# Patient Record
Sex: MALE | Race: WHITE | NOT HISPANIC OR LATINO | Employment: OTHER | URBAN - NONMETROPOLITAN AREA
[De-identification: names, ages, dates, MRNs, and addresses within clinical notes are randomized per-mention and may not be internally consistent; named-entity substitution may affect disease eponyms.]

---

## 2017-05-26 ENCOUNTER — OFFICE VISIT (OUTPATIENT)
Dept: INTERNAL MEDICINE | Facility: CLINIC | Age: 47
End: 2017-05-26

## 2017-05-26 VITALS
HEART RATE: 90 BPM | WEIGHT: 265.4 LBS | OXYGEN SATURATION: 96 % | DIASTOLIC BLOOD PRESSURE: 80 MMHG | TEMPERATURE: 97.9 F | HEIGHT: 73 IN | BODY MASS INDEX: 35.17 KG/M2 | SYSTOLIC BLOOD PRESSURE: 114 MMHG

## 2017-05-26 DIAGNOSIS — Z76.89 ENCOUNTER TO ESTABLISH CARE: Primary | ICD-10-CM

## 2017-05-26 DIAGNOSIS — Z82.49 FAMILY HISTORY OF HEART ATTACK: ICD-10-CM

## 2017-05-26 DIAGNOSIS — E66.9 OBESITY, UNSPECIFIED OBESITY SEVERITY, UNSPECIFIED OBESITY TYPE: ICD-10-CM

## 2017-05-26 DIAGNOSIS — R53.83 FATIGUE, UNSPECIFIED TYPE: ICD-10-CM

## 2017-05-26 PROCEDURE — 99203 OFFICE O/P NEW LOW 30 MIN: CPT | Performed by: NURSE PRACTITIONER

## 2017-05-26 RX ORDER — HYDROCODONE BITARTRATE AND ACETAMINOPHEN 7.5; 325 MG/1; MG/1
1 TABLET ORAL EVERY 6 HOURS PRN
COMMUNITY
End: 2018-06-21

## 2017-05-26 RX ORDER — GABAPENTIN 600 MG/1
600 TABLET ORAL 4 TIMES DAILY
COMMUNITY
End: 2018-06-21 | Stop reason: DRUGHIGH

## 2017-05-30 ENCOUNTER — TELEPHONE (OUTPATIENT)
Dept: INTERNAL MEDICINE | Facility: CLINIC | Age: 47
End: 2017-05-30

## 2017-05-30 DIAGNOSIS — R73.01 ELEVATED FASTING BLOOD SUGAR: Primary | ICD-10-CM

## 2017-05-30 LAB
ALBUMIN SERPL-MCNC: 4.4 G/DL (ref 3.5–5)
ALBUMIN/GLOB SERPL: 1.6 G/DL (ref 1–2)
ALP SERPL-CCNC: 94 U/L (ref 38–126)
ALT SERPL-CCNC: 52 U/L (ref 13–69)
AST SERPL-CCNC: 25 U/L (ref 15–46)
BASOPHILS # BLD AUTO: 0.08 10*3/MM3 (ref 0–0.2)
BASOPHILS NFR BLD AUTO: 0.8 % (ref 0–2.5)
BILIRUB SERPL-MCNC: 0.6 MG/DL (ref 0.2–1.3)
BUN SERPL-MCNC: 15 MG/DL (ref 7–20)
BUN/CREAT SERPL: 18.8 (ref 6.3–21.9)
CALCIUM SERPL-MCNC: 9.5 MG/DL (ref 8.4–10.2)
CHLORIDE SERPL-SCNC: 105 MMOL/L (ref 98–107)
CHOLEST SERPL-MCNC: 198 MG/DL (ref 0–199)
CO2 SERPL-SCNC: 23 MMOL/L (ref 26–30)
CONV COMMENT: ABNORMAL
CREAT SERPL-MCNC: 0.8 MG/DL (ref 0.6–1.3)
EOSINOPHIL # BLD AUTO: 0.19 10*3/MM3 (ref 0–0.7)
EOSINOPHIL NFR BLD AUTO: 1.8 % (ref 0–7)
ERYTHROCYTE [DISTWIDTH] IN BLOOD BY AUTOMATED COUNT: 13.3 % (ref 11.5–14.5)
GLOBULIN SER CALC-MCNC: 2.8 GM/DL
GLUCOSE SERPL-MCNC: 109 MG/DL (ref 74–98)
HCT VFR BLD AUTO: 46.4 % (ref 42–52)
HDLC SERPL-MCNC: 32 MG/DL (ref 40–60)
HGB BLD-MCNC: 15.9 G/DL (ref 14–18)
IMM GRANULOCYTES # BLD: 0.03 10*3/MM3 (ref 0–0.06)
IMM GRANULOCYTES NFR BLD: 0.3 % (ref 0–0.6)
LDLC SERPL CALC-MCNC: ABNORMAL MG/DL
LYMPHOCYTES # BLD AUTO: 2.44 10*3/MM3 (ref 0.6–3.4)
LYMPHOCYTES NFR BLD AUTO: 23.3 % (ref 10–50)
MCH RBC QN AUTO: 29.2 PG (ref 27–31)
MCHC RBC AUTO-ENTMCNC: 34.3 G/DL (ref 30–37)
MCV RBC AUTO: 85.3 FL (ref 80–94)
MONOCYTES # BLD AUTO: 0.57 10*3/MM3 (ref 0–0.9)
MONOCYTES NFR BLD AUTO: 5.4 % (ref 0–12)
NEUTROPHILS # BLD AUTO: 7.17 10*3/MM3 (ref 2–6.9)
NEUTROPHILS NFR BLD AUTO: 68.4 % (ref 37–80)
NRBC BLD AUTO-RTO: 0 /100 WBC (ref 0–0)
PLATELET # BLD AUTO: 244 10*3/MM3 (ref 130–400)
POTASSIUM SERPL-SCNC: 4.4 MMOL/L (ref 3.5–5.1)
PROT SERPL-MCNC: 7.2 G/DL (ref 6.3–8.2)
PSA SERPL-MCNC: 1.2 NG/ML (ref 0.06–4)
RBC # BLD AUTO: 5.44 10*6/MM3 (ref 4.7–6.1)
SODIUM SERPL-SCNC: 142 MMOL/L (ref 137–145)
TESTOST SERPL-MCNC: 254 NG/DL (ref 348–1197)
TRIGL SERPL-MCNC: 446 MG/DL
VLDLC SERPL CALC-MCNC: ABNORMAL MG/DL
WBC # BLD AUTO: 10.48 10*3/MM3 (ref 4.8–10.8)

## 2017-06-14 ENCOUNTER — OFFICE VISIT (OUTPATIENT)
Dept: INTERNAL MEDICINE | Facility: CLINIC | Age: 47
End: 2017-06-14

## 2017-06-14 VITALS
DIASTOLIC BLOOD PRESSURE: 80 MMHG | OXYGEN SATURATION: 94 % | SYSTOLIC BLOOD PRESSURE: 120 MMHG | HEIGHT: 73 IN | BODY MASS INDEX: 36.05 KG/M2 | WEIGHT: 272 LBS | HEART RATE: 74 BPM

## 2017-06-14 DIAGNOSIS — E66.09 OBESITY DUE TO EXCESS CALORIES, UNSPECIFIED OBESITY SEVERITY: ICD-10-CM

## 2017-06-14 DIAGNOSIS — R73.09 ELEVATED GLUCOSE LEVEL: Primary | ICD-10-CM

## 2017-06-14 LAB — HBA1C MFR BLD: 5.9 %

## 2017-06-14 PROCEDURE — 83036 HEMOGLOBIN GLYCOSYLATED A1C: CPT | Performed by: FAMILY MEDICINE

## 2017-06-14 PROCEDURE — 99213 OFFICE O/P EST LOW 20 MIN: CPT | Performed by: FAMILY MEDICINE

## 2017-06-14 NOTE — PROGRESS NOTES
Subjective   Fahad Mercedes is a 47 y.o. male.     History of Present Illness   Koko sees Dr. Sparks for pain mgmt and that's who writes the hydrocodone and the gabapentin.  He has low T.  BMI is 35 and we have discussed weight loss.   His glucose was 109.  He had A1C POCT today that was 5.9.  He is simply hyperglycemic.  He understands that low T is most likely due to the adipose tissue.  VSS. AND.  We will get records from [facility].        The following portions of the patient's history were reviewed and updated as appropriate: allergies, current medications, past social history and problem list.    Review of Systems   Constitutional: Negative for appetite change, chills, fatigue, fever and unexpected weight change.   HENT: Negative for congestion, ear pain, hearing loss, nosebleeds, postnasal drip, rhinorrhea, sore throat, tinnitus and trouble swallowing.    Eyes: Negative for photophobia, discharge and visual disturbance.   Respiratory: Negative for cough, chest tightness, shortness of breath and wheezing.    Cardiovascular: Negative for chest pain, palpitations and leg swelling.   Gastrointestinal: Negative for abdominal distention, abdominal pain, blood in stool, constipation, diarrhea, nausea and vomiting.   Endocrine: Negative for cold intolerance, heat intolerance, polydipsia, polyphagia and polyuria.   Musculoskeletal: Negative for arthralgias, back pain, joint swelling, myalgias, neck pain and neck stiffness.   Skin: Negative for color change, pallor, rash and wound.   Allergic/Immunologic: Negative for environmental allergies, food allergies and immunocompromised state.   Neurological: Negative for dizziness, tremors, seizures, weakness, numbness and headaches.   Hematological: Negative for adenopathy. Does not bruise/bleed easily.   Psychiatric/Behavioral: Negative for agitation, behavioral problems, confusion, hallucinations, self-injury and suicidal ideas. The patient is not  "nervous/anxious.        Objective   /80  Pulse 74  Ht 73\" (185.4 cm)  Wt 272 lb (123 kg)  SpO2 94%  BMI 35.89 kg/m2  Physical Exam   Constitutional: He is oriented to person, place, and time. He appears well-developed and well-nourished. No distress.   HENT:   Head: Normocephalic and atraumatic.   Right Ear: External ear normal.   Left Ear: External ear normal.   Nose: Nose normal.   Mouth/Throat: Oropharynx is clear and moist.   Eyes: Conjunctivae and EOM are normal. Pupils are equal, round, and reactive to light. Right eye exhibits no discharge. Left eye exhibits no discharge. No scleral icterus.   Neck: Normal range of motion. Neck supple. No JVD present. No tracheal deviation present. No thyromegaly present.   Cardiovascular: Normal rate, regular rhythm, normal heart sounds and intact distal pulses.  Exam reveals no gallop and no friction rub.    No murmur heard.  Pulmonary/Chest: Effort normal and breath sounds normal. No stridor. No respiratory distress. He has no wheezes. He has no rales. He exhibits no tenderness.   Abdominal: Soft. Bowel sounds are normal. He exhibits no distension and no mass. There is no tenderness. There is no rebound and no guarding. No hernia.   Musculoskeletal: Normal range of motion. He exhibits no edema, tenderness or deformity.   Lymphadenopathy:     He has no cervical adenopathy.   Neurological: He is alert and oriented to person, place, and time. He has normal reflexes. He displays normal reflexes. No cranial nerve deficit. He exhibits normal muscle tone.   Skin: Skin is warm and dry. No rash noted. No erythema. No pallor.   Psychiatric: He has a normal mood and affect. His behavior is normal. Judgment normal.       Assessment/Plan   Problem List Items Addressed This Visit        Digestive    Obesity due to excess calories       Other    Elevated glucose level - Primary    Relevant Orders    POC Glycosylated Hemoglobin (Hb A1C) (Completed)             "

## 2017-08-08 ENCOUNTER — TELEPHONE (OUTPATIENT)
Dept: INTERNAL MEDICINE | Facility: CLINIC | Age: 47
End: 2017-08-08

## 2017-08-08 NOTE — TELEPHONE ENCOUNTER
PATIENT CALLED AND WANTED TO KNOW IF DR RANGEL WOULD CALL HIM IN AN ANTIBIOTIC FOR AN EARACHE. PLEASE CALL AND LET HIM KNOW.    THANKS

## 2017-08-09 NOTE — TELEPHONE ENCOUNTER
Please let patient know that without being over to look to the year to see if it's middle ear or external ear infection optimal know what medication he needs.  He needs to be seen.  I'm sorry for the inconvenience.

## 2017-08-09 NOTE — TELEPHONE ENCOUNTER
PATIENT UNABLE TO COME IN UNLESS IT IS FIRST THING IN MORNING, NO EARLY APPOINTMENTS AVAILABLE, OFFERED APPT WITH EXTENDER, PT DECLINED.

## 2018-06-21 ENCOUNTER — OFFICE VISIT (OUTPATIENT)
Dept: INTERNAL MEDICINE | Facility: CLINIC | Age: 48
End: 2018-06-21

## 2018-06-21 ENCOUNTER — RESULTS ENCOUNTER (OUTPATIENT)
Dept: INTERNAL MEDICINE | Facility: CLINIC | Age: 48
End: 2018-06-21

## 2018-06-21 VITALS
WEIGHT: 259.75 LBS | SYSTOLIC BLOOD PRESSURE: 112 MMHG | RESPIRATION RATE: 16 BRPM | TEMPERATURE: 97.4 F | DIASTOLIC BLOOD PRESSURE: 78 MMHG | OXYGEN SATURATION: 95 % | HEIGHT: 73 IN | HEART RATE: 92 BPM | BODY MASS INDEX: 34.43 KG/M2

## 2018-06-21 DIAGNOSIS — R53.83 FATIGUE, UNSPECIFIED TYPE: ICD-10-CM

## 2018-06-21 DIAGNOSIS — Z12.11 SCREEN FOR COLON CANCER: ICD-10-CM

## 2018-06-21 DIAGNOSIS — R73.9 HYPERGLYCEMIA: ICD-10-CM

## 2018-06-21 DIAGNOSIS — J01.40 ACUTE PANSINUSITIS, RECURRENCE NOT SPECIFIED: Primary | ICD-10-CM

## 2018-06-21 DIAGNOSIS — E78.2 ELEVATED TRIGLYCERIDES WITH HIGH CHOLESTEROL: ICD-10-CM

## 2018-06-21 DIAGNOSIS — R63.4 WEIGHT LOSS: ICD-10-CM

## 2018-06-21 DIAGNOSIS — R10.32 LEFT LOWER QUADRANT PAIN: ICD-10-CM

## 2018-06-21 PROCEDURE — 99214 OFFICE O/P EST MOD 30 MIN: CPT | Performed by: NURSE PRACTITIONER

## 2018-06-21 RX ORDER — TIZANIDINE 4 MG/1
2 TABLET ORAL AS NEEDED
COMMUNITY

## 2018-06-21 RX ORDER — AMOXICILLIN AND CLAVULANATE POTASSIUM 875; 125 MG/1; MG/1
1 TABLET, FILM COATED ORAL 2 TIMES DAILY
Qty: 20 TABLET | Refills: 0 | Status: SHIPPED | OUTPATIENT
Start: 2018-06-21 | End: 2018-07-01

## 2018-06-21 RX ORDER — GABAPENTIN 800 MG/1
800 TABLET ORAL 3 TIMES DAILY
COMMUNITY

## 2018-06-21 NOTE — PROGRESS NOTES
Chief Complaint / Reason:      Chief Complaint   Patient presents with   • Sinus Problem     onset Sunday, can't taste or smell, loss of appetite.Taking Benadryl prn.    • Cyst     to left side of belly button, family hx of colon cancer. Weight loss. Would like to do a stool test.        Subjective     HPI  Patient presents today with complaints of sinus issue onset was Sunday and had been going on for some time prior to that but symptoms began worsening on Sunday.  He states he can't taste or smell has decreased appetite and has been taking Benadryl as needed.  He also states that there is a sister not on the left side of his belly button he has a family history of colon cancer and has had some weight loss and is wondering if he should do a stool test.  He reports occasional blood in stool but he has had hemorrhoids in the past.  Denies chest pain, shortness of breath or heart palpitations he states he is coughing.  Vital signs are stable.  History taken from: patient    PMH/FH/Social History were reviewed and updated appropriately in the electronic medical record.     Review of Systems:   Review of Systems   Constitutional: Positive for fatigue.   HENT: Positive for congestion, sinus pain, sinus pressure and sore throat.    Respiratory: Positive for cough.    Cardiovascular: Negative.    Gastrointestinal: Positive for abdominal pain (knot on left side of abdomen).   Skin: Negative.    Neurological: Positive for headaches.   Hematological: Negative for adenopathy.     All other systems were reviewed and are negative.  Exceptions are noted in the subjective or above.      Objective     Vital Signs  Vitals:    06/21/18 1022   BP: 112/78   Pulse: 92   Resp: 16   Temp: 97.4 °F (36.3 °C)   SpO2: 95%       Body mass index is 34.27 kg/m².    Physical Exam   Constitutional: He is oriented to person, place, and time. He appears well-developed and well-nourished.   HENT:   Head: Normocephalic.   Right Ear: External ear  normal. Tympanic membrane is erythematous and bulging.   Left Ear: External ear normal. Tympanic membrane is erythematous and bulging.   Nose: Right sinus exhibits maxillary sinus tenderness and frontal sinus tenderness. Left sinus exhibits maxillary sinus tenderness and frontal sinus tenderness.   Mouth/Throat: Mucous membranes are dry. Posterior oropharyngeal erythema present.   Cardiovascular: Normal rate, regular rhythm, normal heart sounds and intact distal pulses.    Pulmonary/Chest: Effort normal and breath sounds normal.   Abdominal: Soft. Bowel sounds are normal. There is tenderness in the periumbilical area and left lower quadrant.       Lymphadenopathy:     He has no cervical adenopathy.   Neurological: He is alert and oriented to person, place, and time.   Skin: Skin is warm and dry.   Psychiatric: He has a normal mood and affect. His behavior is normal. Judgment and thought content normal.   Nursing note and vitals reviewed.       Results Review:    I reviewed the patient's previous clinical results.       Medication Review:     Current Outpatient Prescriptions:   •  gabapentin (NEURONTIN) 800 MG tablet, Every 8 (Eight) Hours., Disp: , Rfl:   •  tiZANidine (ZANAFLEX) 4 MG tablet, tizanidine 4 mg tablet  take 1 tablet by mouth once daily, Disp: , Rfl:   •  amoxicillin-clavulanate (AUGMENTIN) 875-125 MG per tablet, Take 1 tablet by mouth 2 (Two) Times a Day for 10 days., Disp: 20 tablet, Rfl: 0    Assessment/Plan   Fahad was seen today for sinus problem and cyst.    Diagnoses and all orders for this visit:    Acute pansinusitis, recurrence not specified  -     amoxicillin-clavulanate (AUGMENTIN) 875-125 MG per tablet; Take 1 tablet by mouth 2 (Two) Times a Day for 10 days.    Screen for colon cancer  -     Cologuard - Stool, Per Rectum; Future    Left lower quadrant pain  -     CT abdomen wo contrast  -     Helicobacter Pylori, IgA IgG IgM    Fatigue, unspecified type  -     TSH  -     T4, free  -      Vitamin D 25 hydroxy  -     Comprehensive metabolic panel  -     CBC w AUTO Differential    Elevated triglycerides with high cholesterol  -     Lipid panel    Hyperglycemia  -     Hemoglobin A1c    Weight loss  -     Helicobacter Pylori, IgA IgG IgM        Return in about 4 weeks (around 7/19/2018), or if symptoms worsen or fail to improve.    Ana María Laguna, APRN  06/21/2018

## 2018-06-25 LAB
25(OH)D3+25(OH)D2 SERPL-MCNC: 30.9 NG/ML
ALBUMIN SERPL-MCNC: 4.7 G/DL (ref 3.5–5)
ALBUMIN/GLOB SERPL: 1.5 G/DL (ref 1–2)
ALP SERPL-CCNC: 103 U/L (ref 38–126)
ALT SERPL-CCNC: 51 U/L (ref 13–69)
AST SERPL-CCNC: 34 U/L (ref 15–46)
BASOPHILS # BLD AUTO: 0.08 10*3/MM3 (ref 0–0.2)
BASOPHILS NFR BLD AUTO: 0.6 % (ref 0–2.5)
BILIRUB SERPL-MCNC: 0.7 MG/DL (ref 0.2–1.3)
BUN SERPL-MCNC: 12 MG/DL (ref 7–20)
BUN/CREAT SERPL: 13.3 (ref 6.3–21.9)
CALCIUM SERPL-MCNC: 10 MG/DL (ref 8.4–10.2)
CHLORIDE SERPL-SCNC: 104 MMOL/L (ref 98–107)
CHOLEST SERPL-MCNC: 184 MG/DL (ref 0–199)
CO2 SERPL-SCNC: 28 MMOL/L (ref 26–30)
CREAT SERPL-MCNC: 0.9 MG/DL (ref 0.6–1.3)
EOSINOPHIL # BLD AUTO: 0.26 10*3/MM3 (ref 0–0.7)
EOSINOPHIL NFR BLD AUTO: 2.1 % (ref 0–7)
ERYTHROCYTE [DISTWIDTH] IN BLOOD BY AUTOMATED COUNT: 13.1 % (ref 11.5–14.5)
GFR SERPLBLD CREATININE-BSD FMLA CKD-EPI: 108 ML/MIN/1.73
GFR SERPLBLD CREATININE-BSD FMLA CKD-EPI: 89 ML/MIN/1.73
GLOBULIN SER CALC-MCNC: 3.2 GM/DL
GLUCOSE SERPL-MCNC: 93 MG/DL (ref 74–98)
H PYLORI IGA SER-ACNC: <9 UNITS (ref 0–8.9)
H PYLORI IGG SER IA-ACNC: 0.38 INDEX VALUE (ref 0–0.79)
H PYLORI IGM SER-ACNC: <9 UNITS (ref 0–8.9)
HBA1C MFR BLD: 6.2 %
HCT VFR BLD AUTO: 49.2 % (ref 42–52)
HDLC SERPL-MCNC: 30 MG/DL (ref 40–60)
HGB BLD-MCNC: 16.6 G/DL (ref 14–18)
IMM GRANULOCYTES # BLD: 0.06 10*3/MM3 (ref 0–0.06)
IMM GRANULOCYTES NFR BLD: 0.5 % (ref 0–0.6)
LDLC SERPL CALC-MCNC: 103 MG/DL (ref 0–99)
LYMPHOCYTES # BLD AUTO: 3.29 10*3/MM3 (ref 0.6–3.4)
LYMPHOCYTES NFR BLD AUTO: 26.4 % (ref 10–50)
MCH RBC QN AUTO: 29.1 PG (ref 27–31)
MCHC RBC AUTO-ENTMCNC: 33.7 G/DL (ref 30–37)
MCV RBC AUTO: 86.2 FL (ref 80–94)
MONOCYTES # BLD AUTO: 0.63 10*3/MM3 (ref 0–0.9)
MONOCYTES NFR BLD AUTO: 5.1 % (ref 0–12)
NEUTROPHILS # BLD AUTO: 8.14 10*3/MM3 (ref 2–6.9)
NEUTROPHILS NFR BLD AUTO: 65.3 % (ref 37–80)
NRBC BLD AUTO-RTO: 0 /100 WBC (ref 0–0)
PLATELET # BLD AUTO: 236 10*3/MM3 (ref 130–400)
POTASSIUM SERPL-SCNC: 5 MMOL/L (ref 3.5–5.1)
PROT SERPL-MCNC: 7.9 G/DL (ref 6.3–8.2)
RBC # BLD AUTO: 5.71 10*6/MM3 (ref 4.7–6.1)
SODIUM SERPL-SCNC: 142 MMOL/L (ref 137–145)
T4 FREE SERPL-MCNC: 1.07 NG/DL (ref 0.78–2.19)
TRIGL SERPL-MCNC: 256 MG/DL
TSH SERPL DL<=0.005 MIU/L-ACNC: 1.65 MIU/ML (ref 0.47–4.68)
VLDLC SERPL CALC-MCNC: 51.2 MG/DL
WBC # BLD AUTO: 12.46 10*3/MM3 (ref 4.8–10.8)

## 2018-07-03 ENCOUNTER — HOSPITAL ENCOUNTER (OUTPATIENT)
Dept: CT IMAGING | Facility: HOSPITAL | Age: 48
Discharge: HOME | End: 2018-07-03
Attending: NURSE PRACTITIONER | Admitting: NURSE PRACTITIONER

## 2018-07-03 PROCEDURE — 25010000002 IOPAMIDOL 61 % SOLUTION: Performed by: NURSE PRACTITIONER

## 2018-07-03 PROCEDURE — 74177 CT ABD & PELVIS W/CONTRAST: CPT

## 2018-07-03 RX ADMIN — IOPAMIDOL 95 ML: 612 INJECTION, SOLUTION INTRAVENOUS at 11:10

## 2018-07-26 ENCOUNTER — OFFICE VISIT (OUTPATIENT)
Dept: INTERNAL MEDICINE | Facility: CLINIC | Age: 48
End: 2018-07-26

## 2018-07-26 VITALS
OXYGEN SATURATION: 96 % | HEART RATE: 80 BPM | HEIGHT: 73 IN | TEMPERATURE: 96.7 F | DIASTOLIC BLOOD PRESSURE: 70 MMHG | RESPIRATION RATE: 16 BRPM | WEIGHT: 259.13 LBS | BODY MASS INDEX: 34.34 KG/M2 | SYSTOLIC BLOOD PRESSURE: 118 MMHG

## 2018-07-26 DIAGNOSIS — K76.0 FATTY LIVER: ICD-10-CM

## 2018-07-26 DIAGNOSIS — K57.90 DIVERTICULOSIS OF INTESTINE WITHOUT BLEEDING, UNSPECIFIED INTESTINAL TRACT LOCATION: ICD-10-CM

## 2018-07-26 DIAGNOSIS — J32.9 SINUSITIS, UNSPECIFIED CHRONICITY, UNSPECIFIED LOCATION: ICD-10-CM

## 2018-07-26 DIAGNOSIS — Z09 FOLLOW UP: Primary | ICD-10-CM

## 2018-07-26 PROCEDURE — 99213 OFFICE O/P EST LOW 20 MIN: CPT | Performed by: NURSE PRACTITIONER

## 2018-07-26 NOTE — PROGRESS NOTES
Chief Complaint / Reason:      Chief Complaint   Patient presents with   • Sinusitis     f/u   • Diverticulitis       Subjective     HPI  Patient presents today for follow-up regarding sinusitis and diverticulosis.  He states that he has done dietary modifications and his abdominal pain seems to be improved but he still continues to have the soreness on his abdomen.  Denies fever or chills.  He states that the sinusitis resolved with the antibiotic.  Denies chest pain, shortness of breath or heart palpitations.  History taken from: patient    PMH/FH/Social History were reviewed and updated appropriately in the electronic medical record.     Review of Systems:   Review of Systems   Constitutional: Positive for fatigue.   HENT: Negative.    Eyes: Negative.    Respiratory: Negative.  Negative for cough.    Cardiovascular: Negative.    Gastrointestinal: Negative.  Negative for nausea.   Endocrine: Negative.    Genitourinary: Negative.    Musculoskeletal: Negative.  Negative for joint swelling and myalgias.   Skin: Negative.    Allergic/Immunologic: Negative.    Neurological: Negative.  Negative for weakness and headaches.   Hematological: Negative.    Psychiatric/Behavioral: Negative.      All other systems were reviewed and are negative.  Exceptions are noted in the subjective or above.      Objective     Vital Signs  Vitals:    07/26/18 1518   BP: 118/70   Pulse: 80   Resp: 16   Temp: 96.7 °F (35.9 °C)   SpO2: 96%       Body mass index is 34.19 kg/m².    Physical Exam   Constitutional: He is oriented to person, place, and time. He appears well-developed and well-nourished.   Cardiovascular: Normal rate, regular rhythm, normal heart sounds and intact distal pulses.    Pulmonary/Chest: Effort normal and breath sounds normal. He exhibits no tenderness.   Abdominal: Soft. Bowel sounds are normal. There is generalized tenderness.       Neurological: He is alert and oriented to person, place, and time.   Skin: Skin is  warm and dry.   Psychiatric: He has a normal mood and affect. His behavior is normal. Judgment and thought content normal.   Nursing note and vitals reviewed.       Results Review:    I reviewed the patient's new clinical results.   Results for orders placed in visit on 06/21/18   CT Abdomen Pelvis With Contrast    Narrative EXAMINATION: CT ABDOMEN PELVIS W CONTRAST-      INDICATION: abd lump and pain family history of colon CA; R10.32-Left  lower quadrant pain; R63.4-Abnormal weight loss     TECHNIQUE:  Axial CT data of the     abdomen and pelvis were acquired  helically following IV contrast administration. Multiplanar reformatted  images were generated and reviewed. The radiation dose reduction device  was turned on for each scan per the ALARA (As Low as Reasonably  Achievable) protocol. A metallic BB was placed at the area of palpable  concern by the technologist at the direction of the patient.     COMPARISONS:  None.     FINDINGS:       No dilated small or large bowel. Colonic diverticulosis is noted without  sign of diverticulitis. Normal appendix. No free fluid or enlarged lymph  node.     Fatty infiltration of the liver. The gallbladder, adrenal glands,  kidneys, pancreas and spleen are unremarkable.     Clear lung bases. Unremarkable body wall soft tissues. Specifically, the  imaged portion of the perineum is unremarkable as is the area deep to  the palpable marker. No aggressive bone lesion.       Impression    1. No acute finding in the abdomen or pelvis. Specifically, there is no  imaging abnormality seen deep to the palpable marker in the ventral  abdominal wall and there is no visible abnormality in the imaged portion  of the perineum.     This report was finalized on 7/3/2018 12:05 PM by Cristofer Clemente.            Medication Review:     Current Outpatient Prescriptions:   •  gabapentin (NEURONTIN) 800 MG tablet, Every 8 (Eight) Hours., Disp: , Rfl:   •  tiZANidine (ZANAFLEX) 4 MG tablet, tizanidine 4 mg  tablet  take 1 tablet by mouth once daily, Disp: , Rfl:     Assessment/Plan   Fahad was seen today for sinusitis and diverticulitis.    Diagnoses and all orders for this visit:    Follow up    Diverticulosis of intestine without bleeding, unspecified intestinal tract location  Discussed dietary modification and weight loss.  Recommend patient have colonoscopy or cologuard  He had previously done, guarded but it was encouraged and a recent him another test kit.  Fatty liver  Recommend weight loss and exercise  Sinusitis, unspecified chronicity, unspecified location  Resolved      Return if symptoms worsen or fail to improve.    Ana María Laguna, APRN  07/26/2018

## 2019-03-07 ENCOUNTER — OFFICE VISIT (OUTPATIENT)
Dept: INTERNAL MEDICINE | Facility: CLINIC | Age: 49
End: 2019-03-07

## 2019-03-07 VITALS
SYSTOLIC BLOOD PRESSURE: 131 MMHG | BODY MASS INDEX: 34.33 KG/M2 | RESPIRATION RATE: 15 BRPM | HEIGHT: 73 IN | HEART RATE: 82 BPM | DIASTOLIC BLOOD PRESSURE: 81 MMHG | TEMPERATURE: 98.4 F | OXYGEN SATURATION: 97 % | WEIGHT: 259 LBS

## 2019-03-07 DIAGNOSIS — K57.90 DIVERTICULOSIS OF INTESTINE WITHOUT BLEEDING, UNSPECIFIED INTESTINAL TRACT LOCATION: Primary | ICD-10-CM

## 2019-03-07 PROBLEM — IMO0002 HERNIATION OF INTERVERTEBRAL DISC: Status: ACTIVE | Noted: 2017-02-27

## 2019-03-07 PROBLEM — Z79.899 LONG TERM CURRENT USE OF THERAPEUTIC DRUG: Status: ACTIVE | Noted: 2017-06-12

## 2019-03-07 PROBLEM — F32.A ACUTE DEPRESSION: Status: ACTIVE | Noted: 2018-01-25

## 2019-03-07 PROBLEM — M54.50 LOW BACK PAIN: Status: ACTIVE | Noted: 2018-03-22

## 2019-03-07 PROCEDURE — 99213 OFFICE O/P EST LOW 20 MIN: CPT | Performed by: NURSE PRACTITIONER

## 2019-03-07 RX ORDER — METRONIDAZOLE 500 MG/1
500 TABLET ORAL 2 TIMES DAILY
Qty: 14 TABLET | Refills: 0 | Status: SHIPPED | OUTPATIENT
Start: 2019-03-07 | End: 2019-03-14

## 2019-03-07 RX ORDER — SULFAMETHOXAZOLE AND TRIMETHOPRIM 800; 160 MG/1; MG/1
1 TABLET ORAL 2 TIMES DAILY
Qty: 14 TABLET | Refills: 0 | Status: SHIPPED | OUTPATIENT
Start: 2019-03-07 | End: 2019-03-14

## 2019-03-07 NOTE — PATIENT INSTRUCTIONS
Diverticulitis  Diverticulitis is infection or inflammation of small pouches (diverticula) in the colon that form due to a condition called diverticulosis. Diverticula can trap stool (feces) and bacteria, causing infection and inflammation.  Diverticulitis may cause severe stomach pain and diarrhea. It may lead to tissue damage in the colon that causes bleeding. The diverticula may also burst (rupture) and cause infected stool to enter other areas of the abdomen.  Complications of diverticulitis can include:  · Bleeding.  · Severe infection.  · Severe pain.  · Rupture (perforation) of the colon.  · Blockage (obstruction) of the colon.    What are the causes?  This condition is caused by stool becoming trapped in the diverticula, which allows bacteria to grow in the diverticula. This leads to inflammation and infection.  What increases the risk?  You are more likely to develop this condition if:  · You have diverticulosis. The risk for diverticulosis increases if:  ? You are overweight or obese.  ? You use tobacco products.  ? You do not get enough exercise.  · You eat a diet that does not include enough fiber. High-fiber foods include fruits, vegetables, beans, nuts, and whole grains.    What are the signs or symptoms?  Symptoms of this condition may include:  · Pain and tenderness in the abdomen. The pain is normally located on the left side of the abdomen, but it may occur in other areas.  · Fever and chills.  · Bloating.  · Cramping.  · Nausea.  · Vomiting.  · Changes in bowel routines.  · Blood in your stool.    How is this diagnosed?  This condition is diagnosed based on:  · Your medical history.  · A physical exam.  · Tests to make sure there is nothing else causing your condition. These tests may include:  ? Blood tests.  ? Urine tests.  ? Imaging tests of the abdomen, including X-rays, ultrasounds, MRIs, or CT scans.    How is this treated?  Most cases of this condition are mild and can be treated at home.  Treatment may include:  · Taking over-the-counter pain medicines.  · Following a clear liquid diet.  · Taking antibiotic medicines by mouth.  · Rest.    More severe cases may need to be treated at a hospital. Treatment may include:  · Not eating or drinking.  · Taking prescription pain medicine.  · Receiving antibiotic medicines through an IV tube.  · Receiving fluids and nutrition through an IV tube.  · Surgery.    When your condition is under control, your health care provider may recommend that you have a colonoscopy. This is an exam to look at the entire large intestine. During the exam, a lubricated, bendable tube is inserted into the anus and then passed into the rectum, colon, and other parts of the large intestine. A colonoscopy can show how severe your diverticula are and whether something else may be causing your symptoms.  Follow these instructions at home:  Medicines  · Take over-the-counter and prescription medicines only as told by your health care provider. These include fiber supplements, probiotics, and stool softeners.  · If you were prescribed an antibiotic medicine, take it as told by your health care provider. Do not stop taking the antibiotic even if you start to feel better.  · Do not drive or use heavy machinery while taking prescription pain medicine.  General instructions  · Follow a full liquid diet or another diet as directed by your health care provider. After your symptoms improve, your health care provider may tell you to change your diet. He or she may recommend that you eat a diet that contains at least 25 g (25 grams) of fiber daily. Fiber makes it easier to pass stool. Healthy sources of fiber include:  ? Berries. One cup contains 4-8 grams of fiber.  ? Beans or lentils. One half cup contains 5-8 grams of fiber.  ? Green vegetables. One cup contains 4 grams of fiber.  · Exercise for at least 30 minutes, 3 times each week. You should exercise hard enough to raise your heart rate and  break a sweat.  · Keep all follow-up visits as told by your health care provider. This is important. You may need a colonoscopy.  Contact a health care provider if:  · Your pain does not improve.  · You have a hard time drinking or eating food.  · Your bowel movements do not return to normal.  Get help right away if:  · Your pain gets worse.  · Your symptoms do not get better with treatment.  · Your symptoms suddenly get worse.  · You have a fever.  · You vomit more than one time.  · You have stools that are bloody, black, or tarry.  Summary  · Diverticulitis is infection or inflammation of small pouches (diverticula) in the colon that form due to a condition called diverticulosis. Diverticula can trap stool (feces) and bacteria, causing infection and inflammation.  · You are at higher risk for this condition if you have diverticulosis and you eat a diet that does not include enough fiber.  · Most cases of this condition are mild and can be treated at home. More severe cases may need to be treated at a hospital.  · When your condition is under control, your health care provider may recommend that you have an exam called a colonoscopy. This exam can show how severe your diverticula are and whether something else may be causing your symptoms.  This information is not intended to replace advice given to you by your health care provider. Make sure you discuss any questions you have with your health care provider.  Document Released: 09/27/2006 Document Revised: 01/20/2018 Document Reviewed: 01/20/2018  [employer] Interactive Patient Education © 2018 [employer]

## 2019-03-15 NOTE — PROGRESS NOTES
Chief Complaint / Reason:      Chief Complaint   Patient presents with   • Diverticulitis     my abdomen is hard and infected.        Subjective     HPI  Patient presents today with complaints of abdominal pain and feels like his diverticulitis is flared up he denies fever chills but states that his belly is very bloated and he just do not feel right.  He denies any blood in stool.  Denies chest pain, shortness of breath or heart palpitations vital signs are stable.  History taken from: patient    PMH/FH/Social History were reviewed and updated appropriately in the electronic medical record.     Review of Systems:   Review of Systems   Constitutional: Positive for fatigue.   Respiratory: Negative.    Cardiovascular: Negative.    Gastrointestinal: Positive for abdominal pain.   Musculoskeletal: Positive for arthralgias and back pain.   Psychiatric/Behavioral: Positive for sleep disturbance.     All other systems were reviewed and are negative.  Exceptions are noted in the subjective or above.      Objective     Vital Signs  Vitals:    03/07/19 1535   BP: 131/81   Pulse: 82   Resp: 15   Temp: 98.4 °F (36.9 °C)   SpO2: 97%       Body mass index is 34.17 kg/m².    Physical Exam   Constitutional: He is oriented to person, place, and time. He appears well-developed and well-nourished.   Cardiovascular: Normal rate, regular rhythm, normal heart sounds and intact distal pulses.   Pulmonary/Chest: Effort normal and breath sounds normal. He exhibits no tenderness.   Abdominal: Soft. Bowel sounds are normal. He exhibits distension. There is tenderness. There is no rigidity, no rebound, no guarding and no CVA tenderness.   Neurological: He is alert and oriented to person, place, and time.   Skin: Skin is warm and dry.   Psychiatric: He has a normal mood and affect. His behavior is normal. Judgment and thought content normal.   Nursing note and vitals reviewed.       Results Review:    I reviewed the patient's previous  clinical results.       Medication Review:     Current Outpatient Medications:   •  gabapentin (NEURONTIN) 800 MG tablet, Every 8 (Eight) Hours., Disp: , Rfl:   •  tiZANidine (ZANAFLEX) 4 MG tablet, tizanidine 4 mg tablet  take 1 tablet by mouth once daily, Disp: , Rfl:     Assessment/Plan   Fahad was seen today for diverticulitis.    Diagnoses and all orders for this visit:    Diverticulosis of intestine without bleeding, unspecified intestinal tract location  -     sulfamethoxazole-trimethoprim (BACTRIM DS) 800-160 MG per tablet; Take 1 tablet by mouth 2 (Two) Times a Day for 7 days.  -     metroNIDAZOLE (FLAGYL) 500 MG tablet; Take 1 tablet by mouth 2 (Two) Times a Day for 7 days.    Recommend high-fiber diet along with exercise and maintaining hydration.  Discussed worsening signs and symptoms.      Discussed health maintenance with patient and his components.  Patient is up-to-date on colonoscopy.  Return if symptoms worsen or fail to improve.    Ana María Laguna, APRN  03/07/2019

## 2020-01-03 ENCOUNTER — OFFICE VISIT (OUTPATIENT)
Dept: INTERNAL MEDICINE | Facility: CLINIC | Age: 50
End: 2020-01-03

## 2020-01-03 VITALS
RESPIRATION RATE: 16 BRPM | BODY MASS INDEX: 34.59 KG/M2 | HEIGHT: 73 IN | SYSTOLIC BLOOD PRESSURE: 133 MMHG | OXYGEN SATURATION: 98 % | DIASTOLIC BLOOD PRESSURE: 94 MMHG | WEIGHT: 261 LBS | HEART RATE: 104 BPM | TEMPERATURE: 98.4 F

## 2020-01-03 DIAGNOSIS — K57.90 DIVERTICULOSIS: Primary | ICD-10-CM

## 2020-01-03 DIAGNOSIS — R73.03 PREDIABETES: ICD-10-CM

## 2020-01-03 DIAGNOSIS — R79.89 ABNORMAL CBC: ICD-10-CM

## 2020-01-03 DIAGNOSIS — Z86.010 HISTORY OF COLON POLYPS: ICD-10-CM

## 2020-01-03 DIAGNOSIS — B37.0 ORAL PHARYNGEAL CANDIDIASIS: ICD-10-CM

## 2020-01-03 DIAGNOSIS — R00.0 TACHYCARDIA: ICD-10-CM

## 2020-01-03 DIAGNOSIS — Z13.29 ENCOUNTER FOR SCREENING FOR ENDOCRINE DISORDER: ICD-10-CM

## 2020-01-03 DIAGNOSIS — R03.0 ELEVATED BLOOD PRESSURE READING: ICD-10-CM

## 2020-01-03 PROBLEM — M96.1 LUMBAR POST-LAMINECTOMY SYNDROME: Status: ACTIVE | Noted: 2019-08-05

## 2020-01-03 PROBLEM — G89.21 CHRONIC PAIN DUE TO INJURY: Status: ACTIVE | Noted: 2019-08-05

## 2020-01-03 PROBLEM — M51.36 DEGENERATION OF LUMBAR INTERVERTEBRAL DISC: Status: ACTIVE | Noted: 2019-08-05

## 2020-01-03 LAB
ALBUMIN SERPL-MCNC: 4.9 G/DL (ref 3.5–5.2)
ALBUMIN/GLOB SERPL: 1.6 G/DL
ALP SERPL-CCNC: 100 U/L (ref 39–117)
ALT SERPL-CCNC: 45 U/L (ref 1–41)
AST SERPL-CCNC: 24 U/L (ref 1–40)
BASOPHILS # BLD AUTO: 0.11 10*3/MM3 (ref 0–0.2)
BASOPHILS NFR BLD AUTO: 0.9 % (ref 0–1.5)
BILIRUB SERPL-MCNC: 0.3 MG/DL (ref 0.2–1.2)
BUN SERPL-MCNC: 17 MG/DL (ref 6–20)
BUN/CREAT SERPL: 15.5 (ref 7–25)
CALCIUM SERPL-MCNC: 9.7 MG/DL (ref 8.6–10.5)
CHLORIDE SERPL-SCNC: 99 MMOL/L (ref 98–107)
CO2 SERPL-SCNC: 26.2 MMOL/L (ref 22–29)
CREAT SERPL-MCNC: 1.1 MG/DL (ref 0.76–1.27)
EOSINOPHIL # BLD AUTO: 0.28 10*3/MM3 (ref 0–0.4)
EOSINOPHIL NFR BLD AUTO: 2.3 % (ref 0.3–6.2)
ERYTHROCYTE [DISTWIDTH] IN BLOOD BY AUTOMATED COUNT: 12.8 % (ref 12.3–15.4)
GLOBULIN SER CALC-MCNC: 3.1 GM/DL
GLUCOSE SERPL-MCNC: 108 MG/DL (ref 65–99)
HBA1C MFR BLD: 6.1 % (ref 4.8–5.6)
HCT VFR BLD AUTO: 47.8 % (ref 37.5–51)
HGB BLD-MCNC: 16.4 G/DL (ref 13–17.7)
IMM GRANULOCYTES # BLD AUTO: 0.05 10*3/MM3 (ref 0–0.05)
IMM GRANULOCYTES NFR BLD AUTO: 0.4 % (ref 0–0.5)
LYMPHOCYTES # BLD AUTO: 3.16 10*3/MM3 (ref 0.7–3.1)
LYMPHOCYTES NFR BLD AUTO: 26 % (ref 19.6–45.3)
MCH RBC QN AUTO: 28.6 PG (ref 26.6–33)
MCHC RBC AUTO-ENTMCNC: 34.3 G/DL (ref 31.5–35.7)
MCV RBC AUTO: 83.3 FL (ref 79–97)
MONOCYTES # BLD AUTO: 0.85 10*3/MM3 (ref 0.1–0.9)
MONOCYTES NFR BLD AUTO: 7 % (ref 5–12)
NEUTROPHILS # BLD AUTO: 7.69 10*3/MM3 (ref 1.7–7)
NEUTROPHILS NFR BLD AUTO: 63.4 % (ref 42.7–76)
NRBC BLD AUTO-RTO: 0 /100 WBC (ref 0–0.2)
PLATELET # BLD AUTO: 281 10*3/MM3 (ref 140–450)
POTASSIUM SERPL-SCNC: 4.5 MMOL/L (ref 3.5–5.2)
PROT SERPL-MCNC: 8 G/DL (ref 6–8.5)
RBC # BLD AUTO: 5.74 10*6/MM3 (ref 4.14–5.8)
SODIUM SERPL-SCNC: 140 MMOL/L (ref 136–145)
T4 FREE SERPL-MCNC: 1.02 NG/DL (ref 0.93–1.7)
TSH SERPL DL<=0.005 MIU/L-ACNC: 2.11 UIU/ML (ref 0.27–4.2)
WBC # BLD AUTO: 12.14 10*3/MM3 (ref 3.4–10.8)

## 2020-01-03 PROCEDURE — 99214 OFFICE O/P EST MOD 30 MIN: CPT | Performed by: NURSE PRACTITIONER

## 2020-01-03 RX ORDER — PROPRANOLOL HYDROCHLORIDE 10 MG/1
10 TABLET ORAL 2 TIMES DAILY
Qty: 60 TABLET | Refills: 1 | Status: SHIPPED | OUTPATIENT
Start: 2020-01-03 | End: 2020-06-19

## 2020-01-03 RX ORDER — FLUCONAZOLE 150 MG/1
150 TABLET ORAL ONCE
Qty: 1 TABLET | Refills: 0 | Status: SHIPPED | OUTPATIENT
Start: 2020-01-03 | End: 2020-01-03

## 2020-01-03 RX ORDER — NORTRIPTYLINE HYDROCHLORIDE 50 MG/1
CAPSULE ORAL
COMMUNITY
End: 2020-08-19

## 2020-01-03 NOTE — PROGRESS NOTES
Chief Complaint / Reason:      Chief Complaint   Patient presents with   • Diverticulitis     I want a outer and inner colonoscopy done       Subjective     HPI  Patient presents today with complaints of abdominal pain intermittent and states that he has tried changing his diet but really cannot tell much of a difference.  Also patient states that he feels like his diverticulitis is flaring up but denies fever or chills.  Patient denies blood in urine or stool.  He does have a history of kidney stones.  Patient did have CT scan back in July which showed colonic diverticulosis.  Vitals signs are stable with exception of increased blood pressure and heart rate.  He denies chest pain, shortness of breath.  History taken from: patient    PMH/FH/Social History were reviewed and updated appropriately in the electronic medical record.   Past Medical History:   Diagnosis Date   • Fatigue    • Kidney stones      Past Surgical History:   Procedure Laterality Date   • BACK SURGERY     • EXTERNAL EAR SURGERY       Social History     Socioeconomic History   • Marital status: Single     Spouse name: Not on file   • Number of children: Not on file   • Years of education: Not on file   • Highest education level: Not on file   Tobacco Use   • Smoking status: Current Every Day Smoker     Packs/day: 1.00     Types: Cigarettes   • Smokeless tobacco: Never Used   Substance and Sexual Activity   • Alcohol use: Yes     Comment: OCC. USE    • Drug use: Yes     Types: Hydrocodone     Family History   Problem Relation Age of Onset   • No Known Problems Mother    • No Known Problems Father        Review of Systems:   Review of Systems   Constitutional: Positive for fatigue.   Respiratory: Negative.    Cardiovascular: Negative.    Gastrointestinal: Positive for abdominal distention, abdominal pain and GERD.   Neurological: Negative.    Psychiatric/Behavioral: Negative.  Positive for stress.         All other systems were reviewed and are  negative.  Exceptions are noted in the subjective or above.      Objective     Vital Signs  Vitals:    01/03/20 1020   BP: 133/94   Pulse: 104   Resp: 16   Temp: 98.4 °F (36.9 °C)   SpO2: 98%       Body mass index is 34.43 kg/m².    Physical Exam   Constitutional: He is oriented to person, place, and time. He appears well-developed and well-nourished. No distress.   HENT:   Head: Normocephalic.   Cardiovascular: Regular rhythm, normal heart sounds and intact distal pulses. Tachycardia present.   No murmur heard.  Pulmonary/Chest: Effort normal and breath sounds normal. No respiratory distress. He exhibits no tenderness.   Abdominal: Soft. Bowel sounds are normal. He exhibits distension. There is tenderness in the left lower quadrant.       Neurological: He is alert and oriented to person, place, and time.   Skin: Skin is warm and dry. Capillary refill takes less than 2 seconds. He is not diaphoretic.   Psychiatric: He has a normal mood and affect. His behavior is normal. Judgment and thought content normal.   Nursing note and vitals reviewed.           Results Review:    I reviewed the patient's previous clinical results.       Medication Review:     Current Outpatient Medications:   •  CBD (cannabidiol) oral oil, cannabidiol (CBD) oral oil  Take by oral route., Disp: , Rfl:   •  gabapentin (NEURONTIN) 800 MG tablet, Every 8 (Eight) Hours., Disp: , Rfl:   •  Nerve Stimulator (TENS THERAPY PAIN RELIEF) device, TENS unit and electrodes, Disp: , Rfl:   •  tiZANidine (ZANAFLEX) 4 MG tablet, tizanidine 4 mg tablet  take 1 tablet by mouth once daily, Disp: , Rfl:   •  metroNIDAZOLE (FLAGYL) 500 MG tablet, Take 1 tablet by mouth 3 (Three) Times a Day for 7 days., Disp: 21 tablet, Rfl: 0  •  nortriptyline (PAMELOR) 50 MG capsule, nortriptyline 50 mg capsule  Take 1 capsule every day by oral route at bedtime for 30 days., Disp: , Rfl:   •  nystatin (MYCOSTATIN) 961829 UNIT/ML suspension, Swish and swallow 5 mL 4 (Four) Times  a Day., Disp: 473 mL, Rfl: 0  •  propranolol (INDERAL) 10 MG tablet, Take 1 tablet by mouth 2 (Two) Times a Day., Disp: 60 tablet, Rfl: 1  •  sulfamethoxazole-trimethoprim (BACTRIM DS) 800-160 MG per tablet, Take 1 tablet by mouth 2 (Two) Times a Day for 7 days., Disp: 14 tablet, Rfl: 0    Assessment/Plan   Fahad was seen today for diverticulitis.    Diagnoses and all orders for this visit:    Diverticulosis  -     Ambulatory Referral to Gastroenterology  -     Comprehensive metabolic panel  -     CBC w AUTO Differential  -     sulfamethoxazole-trimethoprim (BACTRIM DS) 800-160 MG per tablet; Take 1 tablet by mouth 2 (Two) Times a Day for 7 days.  -     metroNIDAZOLE (FLAGYL) 500 MG tablet; Take 1 tablet by mouth 3 (Three) Times a Day for 7 days.    History of colon polyps  -     Ambulatory Referral to Gastroenterology    Prediabetes  -     Comprehensive metabolic panel  -     Hemoglobin A1c    Abnormal CBC  -     Ambulatory Referral to Gastroenterology  -     CBC w AUTO Differential    Encounter for screening for endocrine disorder  -     TSH  -     T4, free    Oral pharyngeal candidiasis  -     fluconazole (DIFLUCAN) 150 MG tablet; Take 1 tablet by mouth 1 (One) Time for 1 dose.  -     nystatin (MYCOSTATIN) 284130 UNIT/ML suspension; Swish and swallow 5 mL 4 (Four) Times a Day.    Tachycardia  -     propranolol (INDERAL) 10 MG tablet; Take 1 tablet by mouth 2 (Two) Times a Day.    Elevated blood pressure reading  -     propranolol (INDERAL) 10 MG tablet; Take 1 tablet by mouth 2 (Two) Times a Day.    Initiate lifestyle modifications.   DASH Diet and exercise.   Compliance with medication regimen and discussed ways to prevent of long-term complications from high blood pressure.  Discussed when to seek medical attention.  Encouraged patient to take blood pressure daily and keep a log.        Return if symptoms worsen or fail to improve.    Ana María Laguna, APRN  01/03/2020

## 2020-01-06 RX ORDER — SULFAMETHOXAZOLE AND TRIMETHOPRIM 800; 160 MG/1; MG/1
1 TABLET ORAL 2 TIMES DAILY
Qty: 14 TABLET | Refills: 0 | Status: SHIPPED | OUTPATIENT
Start: 2020-01-06 | End: 2020-01-13

## 2020-01-06 RX ORDER — METRONIDAZOLE 500 MG/1
500 TABLET ORAL 3 TIMES DAILY
Qty: 21 TABLET | Refills: 0 | Status: SHIPPED | OUTPATIENT
Start: 2020-01-06 | End: 2020-01-13

## 2020-01-06 NOTE — PROGRESS NOTES
Please contact patient and let him know lab results do show elevated glucose and elevated ALT along with white blood cell count being elevated and neutrophils.  Also his lymphocytes are elevated slightly.  Please ask patient if he still is having abdominal pain at this time and if he is I can send him in an antibiotic as he could have colitis.  I recommend peripheral smear if white blood cell count is repeated and remains elevated.

## 2020-01-22 ENCOUNTER — LAB (OUTPATIENT)
Dept: LAB | Facility: HOSPITAL | Age: 50
End: 2020-01-22

## 2020-01-22 ENCOUNTER — OFFICE VISIT (OUTPATIENT)
Dept: GASTROENTEROLOGY | Facility: CLINIC | Age: 50
End: 2020-01-22

## 2020-01-22 VITALS
SYSTOLIC BLOOD PRESSURE: 122 MMHG | OXYGEN SATURATION: 98 % | WEIGHT: 258.8 LBS | TEMPERATURE: 98.8 F | HEART RATE: 88 BPM | HEIGHT: 73 IN | BODY MASS INDEX: 34.3 KG/M2 | RESPIRATION RATE: 18 BRPM | DIASTOLIC BLOOD PRESSURE: 84 MMHG

## 2020-01-22 DIAGNOSIS — R79.89 ELEVATED LFTS: ICD-10-CM

## 2020-01-22 DIAGNOSIS — Z80.0 FAMILY HX OF COLON CANCER: ICD-10-CM

## 2020-01-22 DIAGNOSIS — K57.92 ACUTE DIVERTICULITIS OF INTESTINE: ICD-10-CM

## 2020-01-22 DIAGNOSIS — E66.09 CLASS 1 OBESITY DUE TO EXCESS CALORIES WITHOUT SERIOUS COMORBIDITY WITH BODY MASS INDEX (BMI) OF 34.0 TO 34.9 IN ADULT: ICD-10-CM

## 2020-01-22 DIAGNOSIS — Z11.59 ENCOUNTER FOR SCREENING FOR OTHER VIRAL DISEASES: ICD-10-CM

## 2020-01-22 DIAGNOSIS — K73.0 CHRONIC PERSISTENT HEPATITIS, NOT ELSEWHERE CLASSIFIED (HCC): ICD-10-CM

## 2020-01-22 DIAGNOSIS — K59.04 CHRONIC IDIOPATHIC CONSTIPATION: ICD-10-CM

## 2020-01-22 DIAGNOSIS — K75.81 NASH (NONALCOHOLIC STEATOHEPATITIS): ICD-10-CM

## 2020-01-22 DIAGNOSIS — Z86.010 PERSONAL HISTORY OF COLONIC POLYPS: Primary | ICD-10-CM

## 2020-01-22 DIAGNOSIS — K21.9 GASTROESOPHAGEAL REFLUX DISEASE WITHOUT ESOPHAGITIS: ICD-10-CM

## 2020-01-22 DIAGNOSIS — R10.30 LOWER ABDOMINAL PAIN: ICD-10-CM

## 2020-01-22 LAB
FERRITIN SERPL-MCNC: 90.8 NG/ML (ref 30–400)
HBV SURFACE AB SER RIA-ACNC: NORMAL
HBV SURFACE AG SERPL QL IA: NORMAL
HCV AB SER DONR QL: NORMAL
HOLD SPECIMEN: NORMAL
IRON 24H UR-MRATE: 67 MCG/DL (ref 59–158)

## 2020-01-22 PROCEDURE — 36415 COLL VENOUS BLD VENIPUNCTURE: CPT

## 2020-01-22 PROCEDURE — 86706 HEP B SURFACE ANTIBODY: CPT

## 2020-01-22 PROCEDURE — 86803 HEPATITIS C AB TEST: CPT

## 2020-01-22 PROCEDURE — 83540 ASSAY OF IRON: CPT

## 2020-01-22 PROCEDURE — 86708 HEPATITIS A ANTIBODY: CPT

## 2020-01-22 PROCEDURE — 87340 HEPATITIS B SURFACE AG IA: CPT

## 2020-01-22 PROCEDURE — 99204 OFFICE O/P NEW MOD 45 MIN: CPT | Performed by: INTERNAL MEDICINE

## 2020-01-22 PROCEDURE — 82728 ASSAY OF FERRITIN: CPT

## 2020-01-22 PROCEDURE — 86704 HEP B CORE ANTIBODY TOTAL: CPT

## 2020-01-22 RX ORDER — SODIUM CHLORIDE 9 MG/ML
70 INJECTION, SOLUTION INTRAVENOUS CONTINUOUS PRN
Status: CANCELLED | OUTPATIENT
Start: 2020-01-22

## 2020-01-22 RX ORDER — METOCLOPRAMIDE 10 MG/1
10 TABLET ORAL ONCE
Qty: 1 TABLET | Refills: 0 | Status: SHIPPED | OUTPATIENT
Start: 2020-01-22 | End: 2020-01-22

## 2020-01-22 RX ORDER — BISACODYL 5 MG/1
20 TABLET, DELAYED RELEASE ORAL ONCE
Qty: 4 TABLET | Refills: 0 | Status: SHIPPED | OUTPATIENT
Start: 2020-01-22 | End: 2020-01-22

## 2020-01-22 RX ORDER — DICYCLOMINE HYDROCHLORIDE 10 MG/1
10 CAPSULE ORAL 3 TIMES DAILY PRN
Qty: 30 CAPSULE | Refills: 2 | Status: SHIPPED | OUTPATIENT
Start: 2020-01-22 | End: 2020-08-19

## 2020-01-22 RX ORDER — DOCUSATE SODIUM 100 MG/1
100 CAPSULE, LIQUID FILLED ORAL 2 TIMES DAILY
Qty: 60 CAPSULE | Refills: 2 | Status: SHIPPED | OUTPATIENT
Start: 2020-01-22 | End: 2020-08-19

## 2020-01-22 NOTE — PATIENT INSTRUCTIONS
Fatty Liver Disease    Fatty liver disease occurs when too much fat has built up in your liver cells. Fatty liver disease is also called hepatic steatosis or steatohepatitis. The liver removes harmful substances from your bloodstream and produces fluids that your body needs. It also helps your body use and store energy from the food you eat.  In many cases, fatty liver disease does not cause symptoms or problems. It is often diagnosed when tests are being done for other reasons. However, over time, fatty liver can cause inflammation that may lead to more serious liver problems, such as scarring of the liver (cirrhosis) and liver failure.  Fatty liver is associated with insulin resistance, increased body fat, high blood pressure (hypertension), and high cholesterol. These are features of metabolic syndrome and increase your risk for stroke, diabetes, and heart disease.  What are the causes?  This condition may be caused by:  · Drinking too much alcohol.  · Poor nutrition.  · Obesity.  · Cushing's syndrome.  · Diabetes.  · High cholesterol.  · Certain drugs.  · Poisons.  · Some viral infections.  · Pregnancy.  What increases the risk?  You are more likely to develop this condition if you:  · Abuse alcohol.  · Are overweight.  · Have diabetes.  · Have hepatitis.  · Have a high triglyceride level.  · Are pregnant.  What are the signs or symptoms?  Fatty liver disease often does not cause symptoms. If symptoms do develop, they can include:  · Fatigue.  · Weakness.  · Weight loss.  · Confusion.  · Abdominal pain.  · Nausea and vomiting.  · Yellowing of your skin and the white parts of your eyes (jaundice).  · Itchy skin.  How is this diagnosed?  This condition may be diagnosed by:  · A physical exam and medical history.  · Blood tests.  · Imaging tests, such as an ultrasound, CT scan, or MRI.  · A liver biopsy. A small sample of liver tissue is removed using a needle. The sample is then looked at under a microscope.  How  is this treated?  Fatty liver disease is often caused by other health conditions. Treatment for fatty liver may involve medicines and lifestyle changes to manage conditions such as:  · Alcoholism.  · High cholesterol.  · Diabetes.  · Being overweight or obese.  Follow these instructions at home:    · Do not drink alcohol. If you have trouble quitting, ask your health care provider how to safely quit with the help of medicine or a supervised program. This is important to keep your condition from getting worse.  · Eat a healthy diet as told by your health care provider. Ask your health care provider about working with a diet and nutrition specialist (dietitian) to develop an eating plan.  · Exercise regularly. This can help you lose weight and control your cholesterol and diabetes. Talk to your health care provider about an exercise plan and which activities are best for you.  · Take over-the-counter and prescription medicines only as told by your health care provider.  · Keep all follow-up visits as told by your health care provider. This is important.  Contact a health care provider if:  You have trouble controlling your:  · Blood sugar. This is especially important if you have diabetes.  · Cholesterol.  · Drinking of alcohol.  Get help right away if:  · You have abdominal pain.  · You have jaundice.  · You have nausea and vomiting.  · You vomit blood or material that looks like coffee grounds.  · You have stools that are black, tar-like, or bloody.  Summary  · Fatty liver disease develops when too much fat builds up in the cells of your liver.  · Fatty liver disease often causes no symptoms or problems. However, over time, fatty liver can cause inflammation that may lead to more serious liver problems, such as scarring of the liver (cirrhosis).  · You are more likely to develop this condition if you abuse alcohol, are pregnant, are overweight, have diabetes, have hepatitis, or have high triglyceride  levels.  · Contact your health care provider if you have trouble controlling your weight, blood sugar, cholesterol, or drinking of alcohol.  This information is not intended to replace advice given to you by your health care provider. Make sure you discuss any questions you have with your health care provider.  Document Released: 02/02/2007 Document Revised: 09/26/2018 Document Reviewed: 09/26/2018  [employer] Interactive Patient Education © 2019 [employer]

## 2020-01-22 NOTE — PROGRESS NOTES
New Patient Consult      Date: 2020   Patient Name: Fahad Mercedes  MRN: [id number]  : 1970     Referring Physician: Ana María Laguna AP*    Chief Complaint   Patient presents with   • Diverticulitis       History of Present Illness: Fahad Mercedes is a 49 y.o. male who is here today to establish care with Gastroenterology for evaluation for abdomina pain. History of  abdominal pain stared 2 weeks ago and wet to The pain is gradual in onset, intermittent, mild  in severity and aching in nature.  Frequency being --.  The pain may last for several minutes.  There is no radiation of abdominal pain.  Eating worsens the pcp  And was started on cipr,  flagyl. Pain was in both flanks, no fever. Associated nausea but no vomiting. Finished one week treatement. No definite relieving factors of abdominal pain.   Recurrent pain episodes and treated as diverticulitis in the past.   No history of change in bowel habit but usually constipated, not on any laxatives. Deny any hematochezia or melena.   History of acid reflux more than 10yrs, takes prilosec every other day. Still gets symptoms once in few days. Chronic smoker.  No odynophagia or dysphagia. There is no history of liver or pancreatic disease. There is no history of anemia. No prior history of EGD. Last colonoscopy about 5 years ago no polyps remove. First colonoscopy at 40s two polyps removed. Family history of colon cancer, grand pa and aunt at 60, 65 yrs of age.       Subjective      Past Medical History:   Past Medical History:   Diagnosis Date   • Fatigue    • Kidney stones        Past Surgical History:   Past Surgical History:   Procedure Laterality Date   • BACK SURGERY     • EXTERNAL EAR SURGERY         Family History:   Family History   Problem Relation Age of Onset   • No Known Problems Mother    • No Known Problems Father        Social History:   Social History     Socioeconomic History   • Marital status: Single     Spouse name: Not  on file   • Number of children: Not on file   • Years of education: Not on file   • Highest education level: Not on file   Tobacco Use   • Smoking status: Current Every Day Smoker     Packs/day: 1.00     Types: Cigarettes   • Smokeless tobacco: Never Used   Substance and Sexual Activity   • Alcohol use: Yes     Comment: OCC. USE    • Drug use: Yes     Types: Hydrocodone   • Sexual activity: Defer         Current Outpatient Medications:   •  CBD (cannabidiol) oral oil, cannabidiol (CBD) oral oil  Take by oral route., Disp: , Rfl:   •  gabapentin (NEURONTIN) 800 MG tablet, Every 8 (Eight) Hours., Disp: , Rfl:   •  Nerve Stimulator (TENS THERAPY PAIN RELIEF) device, TENS unit and electrodes, Disp: , Rfl:   •  nortriptyline (PAMELOR) 50 MG capsule, nortriptyline 50 mg capsule  Take 1 capsule every day by oral route at bedtime for 30 days., Disp: , Rfl:   •  nystatin (MYCOSTATIN) 339122 UNIT/ML suspension, Swish and swallow 5 mL 4 (Four) Times a Day., Disp: 473 mL, Rfl: 0  •  propranolol (INDERAL) 10 MG tablet, Take 1 tablet by mouth 2 (Two) Times a Day., Disp: 60 tablet, Rfl: 1  •  tiZANidine (ZANAFLEX) 4 MG tablet, tizanidine 4 mg tablet  take 1 tablet by mouth once daily, Disp: , Rfl:   •  bisacodyl (DULCOLAX) 5 MG EC tablet, Take 4 tablets by mouth 1 (One) Time for 1 dose. Please see prep instructions from office., Disp: 4 tablet, Rfl: 0  •  dicyclomine (BENTYL) 10 MG capsule, Take 1 capsule by mouth 3 (Three) Times a Day As Needed (abdominal)., Disp: 30 capsule, Rfl: 2  •  docusate sodium (COLACE) 100 MG capsule, Take 1 capsule by mouth 2 (Two) Times a Day., Disp: 60 capsule, Rfl: 2  •  metoclopramide (REGLAN) 10 MG tablet, Take 1 tablet by mouth 1 (One) Time for 1 dose. Please see prep instructions from office., Disp: 1 tablet, Rfl: 0  •  polyethylene glycol (GoLYTELY) 236 g solution, Take 4,000 mL by mouth 1 (One) Time for 1 dose. Please see prep instructions from office., Disp: 4000 mL, Rfl: 0    No Known  "Allergies    Review of Systems:   Review of Systems   Constitutional: Negative for appetite change, fatigue, fever and unexpected weight loss.   Respiratory: Negative for cough, shortness of breath and wheezing.    Cardiovascular: Negative for chest pain, palpitations and leg swelling.   Gastrointestinal: Positive for abdominal distention, abdominal pain, constipation, nausea, GERD and indigestion. Negative for anal bleeding, blood in stool, diarrhea, rectal pain and vomiting.   Genitourinary: Negative for dysuria, frequency and hematuria.   Musculoskeletal: Positive for back pain. Negative for joint swelling.   Skin: Negative for color change, rash and skin lesions.   Neurological: Negative for dizziness, syncope, speech difficulty, weakness, headache and memory problem.   Hematological: Negative for adenopathy. Does not bruise/bleed easily.   Psychiatric/Behavioral: Negative for agitation, behavioral problems, suicidal ideas and depressed mood.       The following portions of the patient's history were reviewed and updated as appropriate: allergies, current medications, past family history, past medical history, past social history, past surgical history and problem list.    Objective     Physical Exam:  Vital Signs:   Vitals:    01/22/20 1024   BP: 122/84   Pulse: 88   Resp: 18   Temp: 98.8 °F (37.1 °C)   TempSrc: Temporal   SpO2: 98%   Weight: 117 kg (258 lb 12.8 oz)   Height: 185.4 cm (73\")       Physical Exam   Constitutional: He is oriented to person, place, and time. Vital signs are normal. He appears well-developed and well-nourished.   obese   HENT:   Head: Normocephalic and atraumatic.   Right Ear: External ear normal.   Left Ear: External ear normal.   Nose: Nose normal.   Mouth/Throat: Oropharynx is clear and moist.   Bad oral hygiene   Eyes: Conjunctivae are normal.   Neck: Normal range of motion. Neck supple.   Cardiovascular: Normal rate, regular rhythm and normal heart sounds.   Pulmonary/Chest: " Effort normal and breath sounds normal.   Abdominal: Soft. Bowel sounds are normal. He exhibits no distension, no ascites and no mass. There is tenderness (mild epigastric  and llq).   Musculoskeletal: Normal range of motion.   Neurological: He is alert and oriented to person, place, and time.   Skin: Skin is warm and dry.   Psychiatric: He has a normal mood and affect. His behavior is normal. Judgment normal.   Nursing note and vitals reviewed.      Results Review:   I have reviewed the patient's new clinical and imaging results and agree with the interpretation.     Assessment / Plan      Assessment & Plan:  1. Acute diverticulitis of intestine  History suggestive of acute diverticulitis of the colon.  However a CT scan of the abdomen pelvis done only revealed diverticulosis without any obvious signs of diverticulitis.  This could also be just a diverticular pain rather than acute diverticulitis.  He did have a leukocytosis suggesting possible diverticulitis.  He seem to have a chronic leukocytosis cold be from chronic smoking.  He feels better after course of Cipro and Flagyl.  Abdominal examination is benign today    2. Lower abdominal pain  Could be diverticular pain, spasms.  Will start on as needed Bentyl.  - dicyclomine (BENTYL) 10 MG capsule; Take 1 capsule by mouth 3 (Three) Times a Day As Needed (abdominal).  Dispense: 30 capsule; Refill: 2    3. Gastroesophageal reflux disease without esophagitis  Longstanding history of gastroesophageal reflux disease for more than 10 years.  Takes over-the-counter low-dose PPI every other day.  He has a persistent symptoms despite on a PPI.  Is also chronic smoker.  We will schedule him for an EGD for further evaluation to rule out García's esophagus.  - Case Request; Standing  - sodium chloride 0.9 % infusion  - Case Request    4. Personal history of colonic polyps  First colonoscopy at 40s few polyps removed.  Colonoscopy about 5 to 6 years ago no polyps removed as  per patient.  - Case Request; Standing  - sodium chloride 0.9 % infusion  - Case Request    5. Family hx of colon cancer  Has a grandparent and aunt had a colon cancer at 60 and 65 years of age.  Due for colonoscopy now  - Case Request; Standing  - sodium chloride 0.9 % infusion  - Case Request    6. MARSH (nonalcoholic steatohepatitis)  Elevated liver enzymes suggesting possible Marsh.  Dietary advice given advised to lose 10% of the body weight in the next 6 months to 12 months.    7. Elevated LFTs  Likely secondary to fatty liver/Marsh.  We will get a basic liver test done to rule out any chronic hepatitis  - Hepatitis C Antibody; Future  - Hepatitis A Antibody, Total; Future  - Hepatitis B Core Antibody, Total; Future  - Hepatitis B Surface Antibody; Future  - Hepatitis B Surface Antigen; Future  - Iron; Future  - Ferritin; Future    8. Class 1 obesity due to excess calories without serious comorbidity with body mass index (BMI) of 34.0 to 34.9 in adult  Weight reduction regular exercise and dietary changes lifestyle changes discussed    9. Chronic idiopathic constipation  We will start him on Colace 100 m p.o. twice daily.  Diet discussed        Follow Up:   Return in about 8 weeks (around 3/18/2020).    Alyx Malave MD  [facility]  1/22/2020  11:56 AM    Please note that portions of this note may have been completed with a voice recognition program. Efforts were made to edit the dictations, but occasionally words are mistranscribed.

## 2020-01-23 LAB
HAV AB SER QL IA: NEGATIVE
HBV CORE AB SER DONR QL IA: NEGATIVE

## 2020-01-24 PROBLEM — Z86.010 PERSONAL HISTORY OF COLONIC POLYPS: Status: ACTIVE | Noted: 2020-01-24

## 2020-01-24 PROBLEM — Z80.0 FAMILY HX OF COLON CANCER: Status: ACTIVE | Noted: 2020-01-24

## 2020-02-18 PROBLEM — K21.9 GERD (GASTROESOPHAGEAL REFLUX DISEASE): Status: ACTIVE | Noted: 2020-01-24

## 2020-02-18 RX ORDER — IBUPROFEN 200 MG
200 TABLET ORAL EVERY 6 HOURS PRN
COMMUNITY
End: 2020-02-20 | Stop reason: HOSPADM

## 2020-02-18 RX ORDER — ASCORBIC ACID 500 MG
1000 TABLET ORAL 2 TIMES DAILY
COMMUNITY

## 2020-02-18 RX ORDER — ACETAMINOPHEN 325 MG/1
650 TABLET ORAL EVERY 6 HOURS PRN
COMMUNITY

## 2020-02-18 RX ORDER — OMEPRAZOLE 20 MG/1
20 CAPSULE, DELAYED RELEASE ORAL
COMMUNITY
End: 2020-06-19

## 2020-02-18 RX ORDER — LIDOCAINE 50 MG/G
1 PATCH TOPICAL EVERY 24 HOURS
COMMUNITY
End: 2020-06-19

## 2020-02-20 ENCOUNTER — ANESTHESIA (OUTPATIENT)
Dept: GASTROENTEROLOGY | Facility: HOSPITAL | Age: 50
End: 2020-02-20

## 2020-02-20 ENCOUNTER — ANESTHESIA EVENT (OUTPATIENT)
Dept: GASTROENTEROLOGY | Facility: HOSPITAL | Age: 50
End: 2020-02-20

## 2020-02-20 ENCOUNTER — HOSPITAL ENCOUNTER (OUTPATIENT)
Facility: HOSPITAL | Age: 50
Setting detail: HOSPITAL OUTPATIENT SURGERY
Discharge: HOME | End: 2020-02-20
Attending: INTERNAL MEDICINE | Admitting: INTERNAL MEDICINE

## 2020-02-20 VITALS
BODY MASS INDEX: 34.96 KG/M2 | OXYGEN SATURATION: 97 % | DIASTOLIC BLOOD PRESSURE: 73 MMHG | SYSTOLIC BLOOD PRESSURE: 124 MMHG | TEMPERATURE: 97.8 F | HEIGHT: 73 IN | WEIGHT: 263.8 LBS | HEART RATE: 70 BPM | RESPIRATION RATE: 18 BRPM

## 2020-02-20 DIAGNOSIS — K21.9 GERD (GASTROESOPHAGEAL REFLUX DISEASE): ICD-10-CM

## 2020-02-20 DIAGNOSIS — Z86.010 PERSONAL HISTORY OF COLONIC POLYPS: ICD-10-CM

## 2020-02-20 DIAGNOSIS — Z80.0 FAMILY HX OF COLON CANCER: ICD-10-CM

## 2020-02-20 PROCEDURE — 88305 TISSUE EXAM BY PATHOLOGIST: CPT | Performed by: INTERNAL MEDICINE

## 2020-02-20 PROCEDURE — 25010000002 PROPOFOL 10 MG/ML EMULSION: Performed by: NURSE ANESTHETIST, CERTIFIED REGISTERED

## 2020-02-20 RX ORDER — MAGNESIUM HYDROXIDE 1200 MG/15ML
LIQUID ORAL AS NEEDED
Status: DISCONTINUED | OUTPATIENT
Start: 2020-02-20 | End: 2020-02-20 | Stop reason: HOSPADM

## 2020-02-20 RX ORDER — PROPOFOL 10 MG/ML
VIAL (ML) INTRAVENOUS AS NEEDED
Status: DISCONTINUED | OUTPATIENT
Start: 2020-02-20 | End: 2020-02-20 | Stop reason: SURG

## 2020-02-20 RX ORDER — ONDANSETRON 2 MG/ML
4 INJECTION INTRAMUSCULAR; INTRAVENOUS ONCE AS NEEDED
Status: CANCELLED | OUTPATIENT
Start: 2020-02-20 | End: 2020-02-20

## 2020-02-20 RX ORDER — LIDOCAINE HYDROCHLORIDE 20 MG/ML
INJECTION, SOLUTION INTRAVENOUS AS NEEDED
Status: DISCONTINUED | OUTPATIENT
Start: 2020-02-20 | End: 2020-02-20 | Stop reason: SURG

## 2020-02-20 RX ORDER — SODIUM CHLORIDE 9 MG/ML
70 INJECTION, SOLUTION INTRAVENOUS CONTINUOUS PRN
Status: DISCONTINUED | OUTPATIENT
Start: 2020-02-20 | End: 2020-02-20 | Stop reason: HOSPADM

## 2020-02-20 RX ADMIN — LIDOCAINE HYDROCHLORIDE 100 MG: 20 INJECTION, SOLUTION INTRAVENOUS at 08:04

## 2020-02-20 RX ADMIN — SODIUM CHLORIDE 70 ML/HR: 9 INJECTION, SOLUTION INTRAVENOUS at 06:45

## 2020-02-20 RX ADMIN — PROPOFOL 600 MG: 10 INJECTION, EMULSION INTRAVENOUS at 08:32

## 2020-02-20 NOTE — ANESTHESIA POSTPROCEDURE EVALUATION
Patient: Fahad Mercedes    Procedure Summary     Date:  02/20/20 Room / Location:  [facility] ENDOSCOPY 1 / [facility] ENDOSCOPY    Anesthesia Start:  0801 Anesthesia Stop:  0837    Procedures:       COLONOSCOPY (N/A Anus)      ESOPHAGOGASTRODUODENOSCOPY (N/A Esophagus) Diagnosis:       Personal history of colonic polyps      Family hx of colon cancer      GERD (gastroesophageal reflux disease)      (Personal history of colonic polyps [Z86.010])      (Family hx of colon cancer [Z80.0])    Surgeon:  Alyx Malave MD Provider:  Tomas Sadler CRNA    Anesthesia Type:  MAC ASA Status:  3          Anesthesia Type: MAC    Vitals  Vitals Value Taken Time   /73 2/20/2020  9:14 AM   Temp 97.8 °F (36.6 °C) 2/20/2020  8:44 AM   Pulse 70 2/20/2020  9:14 AM   Resp 18 2/20/2020  9:14 AM   SpO2 97 % 2/20/2020  9:14 AM           Post Anesthesia Care and Evaluation    Patient location during evaluation: PHASE II  Patient participation: complete - patient participated  Level of consciousness: awake  Pain score: 1  Pain management: adequate  Airway patency: patent  Anesthetic complications: No anesthetic complications  PONV Status: controlled  Cardiovascular status: acceptable and stable  Respiratory status: acceptable  Hydration status: acceptable

## 2020-02-20 NOTE — DISCHARGE INSTRUCTIONS
No pushing, pulling, tugging,  heavy lifting, or strenuous activity.  No major decision making, driving, or drinking alcoholic beverages for 24 hours. ( due to the medications you have  received)  Always use good hand hygiene/washing techniques.  NO driving while taking pain medications.    To assist you in voiding:  Drink plenty of fluids  Listen to running water while attempting to void.    If you are unable to urinate and you have an uncomfortable urge to void or it has been   6 hours since you were discharged, return to the Emergency Room

## 2020-02-20 NOTE — ANESTHESIA PREPROCEDURE EVALUATION
Anesthesia Evaluation     Patient summary reviewed and Nursing notes reviewed   no history of anesthetic complications:  NPO Solid Status: > 8 hours  NPO Liquid Status: > 8 hours           Airway   Mallampati: II  TM distance: >3 FB  Neck ROM: full  no difficulty expected  Dental - normal exam     Pulmonary - negative pulmonary ROS and normal exam   Cardiovascular - normal exam    Rhythm: regular  Rate: normal    (+) hypertension,       Neuro/Psych  (+) numbness, psychiatric history Anxiety and Depression,     GI/Hepatic/Renal/Endo    (+) obesity, morbid obesity, GERD,  renal disease stones,     Musculoskeletal     Abdominal    Substance History - negative use     OB/GYN negative ob/gyn ROS         Other   arthritis,                      Anesthesia Plan    ASA 3     MAC   (Pt told that intravenous sedation will be used as the primary anesthetic along with local anesthesia if necessary. Every effort will be made to make sure the patient is comfortable.     The patient was told they may or may not have recall for the procedure. It was further explained that if the MAC was not adequate that a general anesthetic with either an LMA or endotracheal tube would be required.     Will proceed with the plan of care.)  intravenous induction     Anesthetic plan, all risks, benefits, and alternatives have been provided, discussed and informed consent has been obtained with: patient.

## 2020-02-25 LAB
LAB AP CASE REPORT: NORMAL
PATH REPORT.FINAL DX SPEC: NORMAL

## 2020-06-04 ENCOUNTER — APPOINTMENT (OUTPATIENT)
Dept: LAB | Facility: HOSPITAL | Age: 50
End: 2020-06-04

## 2020-06-04 ENCOUNTER — TRANSCRIBE ORDERS (OUTPATIENT)
Dept: LAB | Facility: HOSPITAL | Age: 50
End: 2020-06-04

## 2020-06-04 DIAGNOSIS — M96.1 POST LAMINECTOMY SYNDROME: Primary | ICD-10-CM

## 2020-06-10 ENCOUNTER — LAB (OUTPATIENT)
Dept: LAB | Facility: HOSPITAL | Age: 50
End: 2020-06-10

## 2020-06-10 DIAGNOSIS — M96.1 POST LAMINECTOMY SYNDROME: ICD-10-CM

## 2020-06-10 LAB — HBA1C MFR BLD: 6 % (ref 4.8–5.6)

## 2020-06-10 PROCEDURE — 36415 COLL VENOUS BLD VENIPUNCTURE: CPT

## 2020-06-10 PROCEDURE — 87081 CULTURE SCREEN ONLY: CPT

## 2020-06-10 PROCEDURE — 83036 HEMOGLOBIN GLYCOSYLATED A1C: CPT

## 2020-06-11 LAB — MRSA SPEC QL CULT: NORMAL

## 2020-06-12 ENCOUNTER — TELEPHONE (OUTPATIENT)
Dept: INTERNAL MEDICINE | Facility: CLINIC | Age: 50
End: 2020-06-12

## 2020-06-19 ENCOUNTER — OFFICE VISIT (OUTPATIENT)
Dept: INTERNAL MEDICINE | Facility: CLINIC | Age: 50
End: 2020-06-19

## 2020-06-19 VITALS
HEIGHT: 73 IN | DIASTOLIC BLOOD PRESSURE: 76 MMHG | RESPIRATION RATE: 16 BRPM | WEIGHT: 268 LBS | BODY MASS INDEX: 35.52 KG/M2 | OXYGEN SATURATION: 96 % | TEMPERATURE: 98.4 F | SYSTOLIC BLOOD PRESSURE: 114 MMHG | HEART RATE: 96 BPM

## 2020-06-19 DIAGNOSIS — K57.90 DIVERTICULOSIS OF INTESTINE WITHOUT BLEEDING, UNSPECIFIED INTESTINAL TRACT LOCATION: ICD-10-CM

## 2020-06-19 DIAGNOSIS — E66.01 CLASS 2 SEVERE OBESITY WITH SERIOUS COMORBIDITY AND BODY MASS INDEX (BMI) OF 35.0 TO 35.9 IN ADULT, UNSPECIFIED OBESITY TYPE (HCC): ICD-10-CM

## 2020-06-19 DIAGNOSIS — Z13.21 SCREENING FOR ENDOCRINE, NUTRITIONAL, METABOLIC AND IMMUNITY DISORDER: ICD-10-CM

## 2020-06-19 DIAGNOSIS — Z13.0 SCREENING FOR ENDOCRINE, NUTRITIONAL, METABOLIC AND IMMUNITY DISORDER: ICD-10-CM

## 2020-06-19 DIAGNOSIS — R00.2 HEART PALPITATIONS: ICD-10-CM

## 2020-06-19 DIAGNOSIS — Z13.29 SCREENING FOR ENDOCRINE, NUTRITIONAL, METABOLIC AND IMMUNITY DISORDER: ICD-10-CM

## 2020-06-19 DIAGNOSIS — E55.9 VITAMIN D INSUFFICIENCY: ICD-10-CM

## 2020-06-19 DIAGNOSIS — Z12.5 SCREENING PSA (PROSTATE SPECIFIC ANTIGEN): ICD-10-CM

## 2020-06-19 DIAGNOSIS — M54.50 CHRONIC BILATERAL LOW BACK PAIN WITHOUT SCIATICA: ICD-10-CM

## 2020-06-19 DIAGNOSIS — G89.29 CHRONIC BILATERAL LOW BACK PAIN WITHOUT SCIATICA: ICD-10-CM

## 2020-06-19 DIAGNOSIS — Z87.891 HISTORY OF TOBACCO USE IN PAST YEAR: ICD-10-CM

## 2020-06-19 DIAGNOSIS — Z00.00 WELCOME TO MEDICARE PREVENTIVE VISIT: Primary | ICD-10-CM

## 2020-06-19 DIAGNOSIS — Z13.228 SCREENING FOR ENDOCRINE, NUTRITIONAL, METABOLIC AND IMMUNITY DISORDER: ICD-10-CM

## 2020-06-19 PROCEDURE — 93000 ELECTROCARDIOGRAM COMPLETE: CPT | Performed by: NURSE PRACTITIONER

## 2020-06-19 PROCEDURE — G0438 PPPS, INITIAL VISIT: HCPCS | Performed by: NURSE PRACTITIONER

## 2020-06-19 PROCEDURE — 99396 PREV VISIT EST AGE 40-64: CPT | Performed by: NURSE PRACTITIONER

## 2020-06-23 ENCOUNTER — TELEPHONE (OUTPATIENT)
Dept: INTERNAL MEDICINE | Facility: CLINIC | Age: 50
End: 2020-06-23

## 2020-06-23 LAB
25(OH)D3+25(OH)D2 SERPL-MCNC: 31.2 NG/ML (ref 30–100)
ALBUMIN SERPL-MCNC: 4.7 G/DL (ref 3.5–5.2)
ALBUMIN/GLOB SERPL: 1.5 G/DL
ALP SERPL-CCNC: 91 U/L (ref 39–117)
ALT SERPL-CCNC: 41 U/L (ref 1–41)
AST SERPL-CCNC: 27 U/L (ref 1–40)
BASOPHILS # BLD AUTO: 0.08 10*3/MM3 (ref 0–0.2)
BASOPHILS NFR BLD AUTO: 0.9 % (ref 0–1.5)
BILIRUB SERPL-MCNC: 0.5 MG/DL (ref 0.2–1.2)
BUN SERPL-MCNC: 12 MG/DL (ref 6–20)
BUN/CREAT SERPL: 12.1 (ref 7–25)
CALCIUM SERPL-MCNC: 9.6 MG/DL (ref 8.6–10.5)
CHLORIDE SERPL-SCNC: 102 MMOL/L (ref 98–107)
CHOLEST SERPL-MCNC: 202 MG/DL (ref 0–200)
CO2 SERPL-SCNC: 27.4 MMOL/L (ref 22–29)
CREAT SERPL-MCNC: 0.99 MG/DL (ref 0.76–1.27)
EOSINOPHIL # BLD AUTO: 0.24 10*3/MM3 (ref 0–0.4)
EOSINOPHIL NFR BLD AUTO: 2.8 % (ref 0.3–6.2)
ERYTHROCYTE [DISTWIDTH] IN BLOOD BY AUTOMATED COUNT: 13.3 % (ref 12.3–15.4)
GLOBULIN SER CALC-MCNC: 3.1 GM/DL
GLUCOSE SERPL-MCNC: 122 MG/DL (ref 65–99)
HCT VFR BLD AUTO: 46.6 % (ref 37.5–51)
HDLC SERPL-MCNC: 30 MG/DL (ref 40–60)
HGB BLD-MCNC: 15.9 G/DL (ref 13–17.7)
IMM GRANULOCYTES # BLD AUTO: 0.04 10*3/MM3 (ref 0–0.05)
IMM GRANULOCYTES NFR BLD AUTO: 0.5 % (ref 0–0.5)
LDLC SERPL CALC-MCNC: 127 MG/DL (ref 0–100)
LYMPHOCYTES # BLD AUTO: 2.71 10*3/MM3 (ref 0.7–3.1)
LYMPHOCYTES NFR BLD AUTO: 31.4 % (ref 19.6–45.3)
MCH RBC QN AUTO: 29.1 PG (ref 26.6–33)
MCHC RBC AUTO-ENTMCNC: 34.1 G/DL (ref 31.5–35.7)
MCV RBC AUTO: 85.2 FL (ref 79–97)
MONOCYTES # BLD AUTO: 0.54 10*3/MM3 (ref 0.1–0.9)
MONOCYTES NFR BLD AUTO: 6.3 % (ref 5–12)
NEUTROPHILS # BLD AUTO: 5.03 10*3/MM3 (ref 1.7–7)
NEUTROPHILS NFR BLD AUTO: 58.1 % (ref 42.7–76)
NRBC BLD AUTO-RTO: 0 /100 WBC (ref 0–0.2)
PLATELET # BLD AUTO: 232 10*3/MM3 (ref 140–450)
POTASSIUM SERPL-SCNC: 4.7 MMOL/L (ref 3.5–5.2)
PROT SERPL-MCNC: 7.8 G/DL (ref 6–8.5)
PSA SERPL-MCNC: 1.16 NG/ML (ref 0–4)
RBC # BLD AUTO: 5.47 10*6/MM3 (ref 4.14–5.8)
SODIUM SERPL-SCNC: 140 MMOL/L (ref 136–145)
TRIGL SERPL-MCNC: 227 MG/DL (ref 0–150)
VIT B12 SERPL-MCNC: 507 PG/ML (ref 211–946)
VLDLC SERPL CALC-MCNC: 45.4 MG/DL
WBC # BLD AUTO: 8.64 10*3/MM3 (ref 3.4–10.8)

## 2020-07-13 ENCOUNTER — TELEPHONE (OUTPATIENT)
Dept: INTERNAL MEDICINE | Facility: CLINIC | Age: 50
End: 2020-07-13

## 2020-07-13 NOTE — TELEPHONE ENCOUNTER
Please contact patient and set up telehealth visit with him I can contact him or send him a text message via [employer] that way you do not have to send him a link or anything.that way he just clicks on link I text him.

## 2020-07-13 NOTE — TELEPHONE ENCOUNTER
PATIENT CALLING IN BECAUSE HE IS CONCERNED ABOUT HIS BREATHING.     PATIENT WAS SEEN LAST MONTH BUT PATIENT FORGOT TO MENTION THAT HE HAD QUIT SMOKING BACK IN April 2020 AND SINCE THEN, HE SEEMS TO HAVE A LITTLE DIFFICULTY WITH BREATHING; SHORTNESS OF BREATH. NEVER HAD A PROBLEM WITH BREATHING UNTIL HE HAD QUIT.       HIS FAMILY HAS MENTIONED THAT HE PROBABLY HAS OR MAY HAVE COPD AND PATIENT WONDERS IF HE NEEDS TO HAVE AN INHALER.     HUB COVID SCREENING - NEGATIVE;     PLEASE CALL IN INHALER TO [id number].      IF PATIENT NEEDS AN APPT, PLEASE CALL AND ADVISE.     PATIENT IS OPEN TO OTHER TELE-HEALTH OPTIONS/VIDEO  BUT PATIENT HAS ANDROID AND NO ACCESS TO [employer] AND NO EMAIL.. - PATIENT SAYS HE DOESN'T KNOW ABOUT [facility] [employer] AND NOT SURE HE WANTS TO COME IN DUE TO COVID CONCERNS.     PATIENT CALLBACK [phone]    [id number] [facility] [phone]

## 2020-07-31 ENCOUNTER — TELEPHONE (OUTPATIENT)
Dept: INTERNAL MEDICINE | Facility: CLINIC | Age: 50
End: 2020-07-31

## 2020-08-15 NOTE — TELEPHONE ENCOUNTER
I had previously asked patient about going on cholesterol medicine and he declined.  I am fine putting him on it and we can start him on a low dose and see how he does as sometimes it does cause muscle aches and we can increase it.

## 2020-08-19 ENCOUNTER — OFFICE VISIT (OUTPATIENT)
Dept: INTERNAL MEDICINE | Facility: CLINIC | Age: 50
End: 2020-08-19

## 2020-08-19 VITALS
WEIGHT: 273 LBS | HEART RATE: 80 BPM | SYSTOLIC BLOOD PRESSURE: 123 MMHG | OXYGEN SATURATION: 97 % | HEIGHT: 73 IN | RESPIRATION RATE: 16 BRPM | DIASTOLIC BLOOD PRESSURE: 75 MMHG | TEMPERATURE: 98.4 F | BODY MASS INDEX: 36.18 KG/M2

## 2020-08-19 DIAGNOSIS — R00.2 HEART PALPITATIONS: Primary | ICD-10-CM

## 2020-08-19 DIAGNOSIS — E78.5 HYPERLIPIDEMIA, UNSPECIFIED HYPERLIPIDEMIA TYPE: ICD-10-CM

## 2020-08-19 DIAGNOSIS — R07.89 INTERMITTENT LEFT-SIDED CHEST PAIN: ICD-10-CM

## 2020-08-19 DIAGNOSIS — R06.02 SHORTNESS OF BREATH AT REST: ICD-10-CM

## 2020-08-19 DIAGNOSIS — E66.01 SEVERE OBESITY WITH BODY MASS INDEX (BMI) OF 36.0 TO 36.9 WITH SERIOUS COMORBIDITY (HCC): ICD-10-CM

## 2020-08-19 PROCEDURE — 99214 OFFICE O/P EST MOD 30 MIN: CPT | Performed by: NURSE PRACTITIONER

## 2020-08-19 RX ORDER — ATORVASTATIN CALCIUM 20 MG/1
20 TABLET, FILM COATED ORAL NIGHTLY
Qty: 30 TABLET | Refills: 1 | Status: SHIPPED | OUTPATIENT
Start: 2020-08-19 | End: 2020-10-28

## 2020-08-19 NOTE — PROGRESS NOTES
Chief Complaint / Reason:      Chief Complaint   Patient presents with   • Hyperlipidemia     wants to be put on lipitor   • Shortness of Breath     x 1 month. with chest pains. has quit smoking . wants referral to cardilogist   • Hypertension     said he was given a bp med but dont take it       Subjective     HPI  Patient presents today and would like to discuss medication management and states that his brother recently had a stroke and he is concerned and is requesting to be put on Lipitor.  He and I had previously talked about cholesterol and he states that he did quit smoking because he is worried.  He did have EKG on 6/19/2020.  He does report shortness of breath that has been going on for about a month with intermittent chest pains.  He is requesting referral to cardiologist.  He states that he has been prescribed blood pressure medicine in the past but since he quit smoking and has lost some weight he has not had to take the medication.  Patient's BMI is 36.  He does have a history of reflux.  History taken from: patient    PMH/FH/Social History were reviewed and updated appropriately in the electronic medical record.   Past Medical History:   Diagnosis Date   • Abdominal pain    • Chest pain     from smoking   • Colon polyps    • Constipation    • Diverticula of intestine    • Dysphagia    • Fatigue    • FHx: colon cancer    • GERD (gastroesophageal reflux disease)    • [place] (hard of hearing)     no aids worn right ear is worse than left   • Hypertension    • Kidney stones     passed without surgery   • Lower back pain    • Nausea    • Palpitations    • Sciatica     right leg   • Wears glasses     reading glasses only     Past Surgical History:   Procedure Laterality Date   • BACK SURGERY     • COLONOSCOPY     • COLONOSCOPY N/A 2/20/2020    Procedure: COLONOSCOPY;  Surgeon: Alyx Malave MD;  Location: [facility] ENDOSCOPY;  Service: Gastroenterology;  Laterality: N/A;   • ENDOSCOPY N/A 2/20/2020     Procedure: ESOPHAGOGASTRODUODENOSCOPY;  Surgeon: Alyx Malave MD;  Location: [facility] ENDOSCOPY;  Service: Gastroenterology;  Laterality: N/A;   • EXTERNAL EAR SURGERY     • LASIK     • TONSILLECTOMY     • WISDOM TOOTH EXTRACTION       Social History     Socioeconomic History   • Marital status: Single     Spouse name: Not on file   • Number of children: Not on file   • Years of education: Not on file   • Highest education level: Not on file   Tobacco Use   • Smoking status: Former Smoker     Packs/day: 1.00     Types: Cigarettes   • Smokeless tobacco: Former User     Types: Snuff   Substance and Sexual Activity   • Alcohol use: Not Currently     Comment: use to use when younger   • Drug use: Yes     Types: Hydrocodone, Marijuana     Comment: cbd oil with thc now   • Sexual activity: Defer     Family History   Problem Relation Age of Onset   • No Known Problems Mother    • No Known Problems Father        Review of Systems:   Review of Systems   Constitutional: Positive for fatigue.   Respiratory: Positive for chest tightness and shortness of breath.    Cardiovascular: Positive for chest pain and palpitations.   Gastrointestinal: Negative.  Positive for GERD.   Musculoskeletal: Positive for arthralgias and back pain.   Neurological: Negative.    Psychiatric/Behavioral: Negative.  Positive for stress.         All other systems were reviewed and are negative.  Exceptions are noted in the subjective or above.      Objective     Vital Signs  Vitals:    08/19/20 1618   BP: 123/75   Pulse: 80   Resp: 16   Temp: 98.4 °F (36.9 °C)   SpO2: 97%       Body mass index is 36.02 kg/m².    Physical Exam   Constitutional: He is oriented to person, place, and time. He appears well-developed and well-nourished. No distress.   HENT:   Head: Normocephalic.   Cardiovascular: Normal rate, regular rhythm, normal heart sounds and intact distal pulses.   No murmur heard.  Pulmonary/Chest: Effort normal and breath sounds normal. No  respiratory distress. He has no wheezes. He exhibits tenderness.   Musculoskeletal: He exhibits tenderness and deformity.   Neurological: He is alert and oriented to person, place, and time.   Skin: Skin is warm and dry. Capillary refill takes less than 2 seconds. He is not diaphoretic.   Psychiatric: He has a normal mood and affect. His behavior is normal. Judgment and thought content normal.   Nursing note and vitals reviewed.           Results Review:    I reviewed the patient's previous clinical results.       Medication Review:     Current Outpatient Medications:   •  acetaminophen (TYLENOL) 325 MG tablet, Take 650 mg by mouth Every 6 (Six) Hours As Needed for Mild Pain ., Disp: , Rfl:   •  gabapentin (NEURONTIN) 800 MG tablet, 800 mg 3 (Three) Times a Day., Disp: , Rfl:   •  Nerve Stimulator (TENS THERAPY PAIN RELIEF) device, TENS unit and electrodes, Disp: , Rfl:   •  tiZANidine (ZANAFLEX) 4 MG tablet, tizanidine 4 mg tablet  take 1 tablet by mouth once daily, Disp: , Rfl:   •  vitamin C (ASCORBIC ACID) 500 MG tablet, Take 2,000 mg by mouth Daily., Disp: , Rfl:   •  atorvastatin (LIPITOR) 20 MG tablet, Take 1 tablet by mouth Every Night., Disp: 30 tablet, Rfl: 1    Assessment/Plan   Fahad was seen today for hyperlipidemia, shortness of breath and hypertension.    Diagnoses and all orders for this visit:    Heart palpitations  -     Ambulatory Referral to Cardiology    Shortness of breath at rest  -     Ambulatory Referral to Cardiology  -     XR Chest PA & Lateral  Morgan worsening signs and symptoms  Intermittent left-sided chest pain  -     Ambulatory Referral to Cardiology  Discussed worsening signs and symptoms recommend increasing activity daily  Hyperlipidemia, unspecified hyperlipidemia type  -     Ambulatory Referral to Cardiology  -     atorvastatin (LIPITOR) 20 MG tablet; Take 1 tablet by mouth Every Night.  Dietary modifications along with exercise.  Will need to increase dosage if patient can  tolerate Lipitor.    Severe obesity with body mass index (BMI) of 36.0 to 36.9 with serious comorbidity (CMS/HCC)    Patient's Body mass index is 36.02 kg/m². BMI is above normal parameters. Recommendations include: exercise counseling and nutrition counseling.      Return if symptoms worsen or fail to improve.    Ana María Laguna, APRN  08/19/2020

## 2020-08-28 ENCOUNTER — CONSULT (OUTPATIENT)
Dept: CARDIOLOGY | Facility: CLINIC | Age: 50
End: 2020-08-28

## 2020-08-28 VITALS
WEIGHT: 269 LBS | OXYGEN SATURATION: 96 % | SYSTOLIC BLOOD PRESSURE: 112 MMHG | BODY MASS INDEX: 35.65 KG/M2 | HEART RATE: 79 BPM | HEIGHT: 73 IN | DIASTOLIC BLOOD PRESSURE: 70 MMHG | RESPIRATION RATE: 20 BRPM

## 2020-08-28 DIAGNOSIS — R06.09 DOE (DYSPNEA ON EXERTION): ICD-10-CM

## 2020-08-28 DIAGNOSIS — E78.5 DYSLIPIDEMIA: ICD-10-CM

## 2020-08-28 DIAGNOSIS — R00.2 PALPITATIONS: ICD-10-CM

## 2020-08-28 DIAGNOSIS — R07.2 PRECORDIAL PAIN: Primary | ICD-10-CM

## 2020-08-28 PROCEDURE — 99204 OFFICE O/P NEW MOD 45 MIN: CPT | Performed by: INTERNAL MEDICINE

## 2020-08-28 NOTE — PROGRESS NOTES
"    Subjective:     Encounter Date:08/28/2020      Patient ID: Fahad Mercedes is a 50 y.o. male.    Chief Complaint: Chest discomfort, shortness of breath and palpitations  HPI  This is a 50-year-old male patient who presents to cardiology clinic to evaluate chest discomfort.  The patient indicates that in the spring of this year after stopping smoking he began experiencing a precordial chest discomfort described as a \"tightness\".  The patient describes his discomfort with a positive Ferguson sign.  The patient indicates the discomfort does not radiate and tends to be exertional in nature.  He will experience symptoms with a fairly consistent level of exertion such as climbing 1 flight of stairs.  The discomfort is relieved with rest and will generally last 10 to 15 minutes per episode.  There is no associated nausea vomiting diaphoresis but he does have shortness of breath with activity also relieved with rest.  He cannot identify any other precipitating aggravating or alleviating features.  The patient has no personal history of myocardial infarction or coronary revascularization.  He has a history of elevated cholesterol which is treated.  He has no personal history of hypertension or diabetes.  However, the patient recently had a hemoglobin A1c which was 6%.  As previously mentioned, the patient stopped smoking in the spring 2020.  He currently uses \"medical marijuana\" which he obtains from relatives in [place].  He has no orthopnea PND or lower extremity edema.  He reports occasional palpitations with a sense that his heart is pounding and racing with exertional activities that is relieved with rest.  He has no history of documented arrhythmia and has never worn a cardiac monitor.  He is never had an ischemic evaluation.  He has significant obesity and obesity related osteoarthritis.  He is unable to do treadmill exercise stress testing.  The patient had a twelve-lead electrocardiogram performed by his primary " care provider and was told that this indicated a prior heart attack.  I have reviewed his twelve-lead electrocardiogram and do not appreciate any pathologic Q waves or other ECG findings to suggest prior myocardial infarction.  The following portions of the patient's history were reviewed and updated as appropriate: allergies, current medications, past family history, past medical history, past social history, past surgical history and problem  Review of Systems   Constitution: Negative for chills, diaphoresis, fever, malaise/fatigue, weight gain and weight loss.   HENT: Negative for ear discharge, hearing loss, hoarse voice and nosebleeds.    Eyes: Negative for discharge, double vision, pain and photophobia.   Cardiovascular: Positive for chest pain, dyspnea on exertion and palpitations. Negative for claudication, cyanosis, irregular heartbeat, leg swelling, near-syncope, orthopnea, paroxysmal nocturnal dyspnea and syncope.   Respiratory: Negative for cough, hemoptysis, shortness of breath, sputum production and wheezing.    Endocrine: Negative for cold intolerance, heat intolerance, polydipsia, polyphagia and polyuria.   Hematologic/Lymphatic: Negative for adenopathy and bleeding problem. Does not bruise/bleed easily.   Skin: Negative for color change, flushing, itching and rash.   Musculoskeletal: Negative for muscle cramps, muscle weakness, myalgias and stiffness.   Gastrointestinal: Negative for abdominal pain, diarrhea, hematemesis, hematochezia, nausea and vomiting.   Genitourinary: Negative for dysuria, frequency and nocturia.   Neurological: Negative for focal weakness, loss of balance, numbness, paresthesias and seizures.   Psychiatric/Behavioral: Negative for altered mental status, hallucinations and suicidal ideas.   Allergic/Immunologic: Negative for HIV exposure, hives and persistent infections.           Current Outpatient Medications:   •  acetaminophen (TYLENOL) 325 MG tablet, Take 650 mg by mouth  Every 6 (Six) Hours As Needed for Mild Pain ., Disp: , Rfl:   •  atorvastatin (LIPITOR) 20 MG tablet, Take 1 tablet by mouth Every Night., Disp: 30 tablet, Rfl: 1  •  gabapentin (NEURONTIN) 800 MG tablet, 800 mg 3 (Three) Times a Day., Disp: , Rfl:   •  Nerve Stimulator (TENS THERAPY PAIN RELIEF) device, TENS unit and electrodes, Disp: , Rfl:   •  tiZANidine (ZANAFLEX) 4 MG tablet, tizanidine 4 mg tablet  take 1 tablet by mouth once daily, Disp: , Rfl:   •  vitamin C (ASCORBIC ACID) 500 MG tablet, Take 2,000 mg by mouth Daily., Disp: , Rfl:     Objective:   Physical Exam   Constitutional: He is oriented to person, place, and time. He appears well-developed and well-nourished. No distress.   HENT:   Head: Normocephalic and atraumatic.   Mouth/Throat: Oropharynx is clear and moist.   Eyes: Pupils are equal, round, and reactive to light. Conjunctivae and EOM are normal. No scleral icterus.   Neck: Normal range of motion. Neck supple. No JVD present. No tracheal deviation present. No thyromegaly present.   Cardiovascular: Normal rate, regular rhythm, S1 normal, S2 normal, normal heart sounds, intact distal pulses and normal pulses. PMI is not displaced. Exam reveals no gallop and no friction rub.   No murmur heard.  Pulmonary/Chest: Effort normal and breath sounds normal. No stridor. No respiratory distress. He has no wheezes. He has no rales.   Abdominal: Soft. Bowel sounds are normal. He exhibits no distension and no mass. There is no tenderness. There is no rebound and no guarding.   Musculoskeletal: Normal range of motion. He exhibits no edema or deformity.   Neurological: He is alert and oriented to person, place, and time. He displays normal reflexes. No cranial nerve deficit. Coordination normal.   Skin: Skin is warm and dry. No rash noted. He is not diaphoretic. No erythema.   Psychiatric: He has a normal mood and affect. His behavior is normal. Thought content normal.     Blood pressure 112/70, pulse 79, resp.  "rate 20, height 185.4 cm (73\"), weight 122 kg (269 lb), SpO2 96 %.   Lab Review:     Assessment:       1. Precordial pain  The patient's chest discomfort has features fairly typical for coronary insufficiency.  The patient has never had an ischemic evaluation.  The patient has multiple risk factors for coronary artery disease.  The patient is unable to do treadmill exercise stress testing due to obesity, shortness of breath with activity and poor effort tolerance.    2. BROWN (dyspnea on exertion)  This is multifactorial in etiology.  Some is certainly related to the patient's obesity and aerobic deconditioning.  There is an element of tobacco-related lung disease with former tobacco abuse.  There may be an element related to hypertensive cardiac disease.  There may be an element of unrecognized congestive heart failure.  This could also represent an angina equivalent.    3. Palpitations  Some of the patient's symptoms could be due to underlying arrhythmia and/or ectopy.  She has never worn an outpatient heart monitor.    4. Dyslipidemia  The patient is on statin based cholesterol-lowering therapy and is followed by his primary care provider for this diagnosis.    Procedures    Plan:     I have recommended a vasodilator nuclear stress test utilizing a 2-day protocol with supine and and prone imaging to help mitigate for anticipated attenuation artifact given his body habitus.  I have recommended an echocardiogram as well as an outpatient cardiac monitor.  The patient has been congratulated on his smoking cessation and cautioned to never again begin smoking.  He has been advised that there is no hard data for marijuana smoking as it relates to cardiovascular health but some data does exist that this may be detrimental.  The patient has been counseled regarding the importance of diet exercise and weight management.  No changes to his medication therapy have been made at today's visit.  Further recommendations will be " predicated on the results of his outpatient testing.

## 2020-09-10 ENCOUNTER — HOSPITAL ENCOUNTER (OUTPATIENT)
Dept: NUCLEAR MEDICINE | Facility: HOSPITAL | Age: 50
Discharge: HOME | End: 2020-09-10

## 2020-09-10 PROCEDURE — 0 TECHNETIUM SESTAMIBI: Performed by: INTERNAL MEDICINE

## 2020-09-10 PROCEDURE — A9500 TC99M SESTAMIBI: HCPCS | Performed by: INTERNAL MEDICINE

## 2020-09-10 PROCEDURE — 78452 HT MUSCLE IMAGE SPECT MULT: CPT | Performed by: INTERNAL MEDICINE

## 2020-09-10 PROCEDURE — 93017 CV STRESS TEST TRACING ONLY: CPT

## 2020-09-10 PROCEDURE — 78452 HT MUSCLE IMAGE SPECT MULT: CPT

## 2020-09-10 PROCEDURE — 25010000002 REGADENOSON 0.4 MG/5ML SOLUTION: Performed by: INTERNAL MEDICINE

## 2020-09-10 PROCEDURE — 93018 CV STRESS TEST I&R ONLY: CPT | Performed by: INTERNAL MEDICINE

## 2020-09-10 RX ADMIN — REGADENOSON 0.4 MG: 0.08 INJECTION, SOLUTION INTRAVENOUS at 08:55

## 2020-09-10 RX ADMIN — TECHNETIUM TC 99M SESTAMIBI 1 DOSE: 1 INJECTION INTRAVENOUS at 08:55

## 2020-09-11 ENCOUNTER — HOSPITAL ENCOUNTER (OUTPATIENT)
Dept: NUCLEAR MEDICINE | Facility: HOSPITAL | Age: 50
Discharge: HOME | End: 2020-09-11

## 2020-09-11 LAB
BH CV STRESS COMMENTS STAGE 1: NORMAL
BH CV STRESS DOSE REGADENOSON STAGE 1: 0.4
BH CV STRESS DURATION MIN STAGE 1: 0
BH CV STRESS DURATION SEC STAGE 1: 10
BH CV STRESS PROTOCOL 1: NORMAL
BH CV STRESS RECOVERY BP: NORMAL MMHG
BH CV STRESS RECOVERY HR: 93 BPM
BH CV STRESS STAGE 1: 1
LV EF NUC BP: 78 %
MAXIMAL PREDICTED HEART RATE: 168 BPM
PERCENT MAX PREDICTED HR: 72.02 %
STRESS BASELINE BP: NORMAL MMHG
STRESS BASELINE HR: 78 BPM
STRESS PERCENT HR: 85 %
STRESS POST PEAK BP: NORMAL MMHG
STRESS POST PEAK HR: 121 BPM
STRESS TARGET HR: 143 BPM

## 2020-09-11 PROCEDURE — A9500 TC99M SESTAMIBI: HCPCS | Performed by: INTERNAL MEDICINE

## 2020-09-11 PROCEDURE — 0 TECHNETIUM SESTAMIBI: Performed by: INTERNAL MEDICINE

## 2020-09-11 RX ADMIN — TECHNETIUM TC 99M SESTAMIBI 1 DOSE: 1 INJECTION INTRAVENOUS at 09:03

## 2020-09-23 LAB
BH CV ECHO MEAS - % IVS THICK: 23.8 %
BH CV ECHO MEAS - % LVPW THICK: 25 %
BH CV ECHO MEAS - AO MAX PG (FULL): 1.5 MMHG
BH CV ECHO MEAS - AO MAX PG: 8 MMHG
BH CV ECHO MEAS - AO MEAN PG (FULL): 1 MMHG
BH CV ECHO MEAS - AO MEAN PG: 4 MMHG
BH CV ECHO MEAS - AO ROOT AREA (BSA CORRECTED): 1
BH CV ECHO MEAS - AO ROOT AREA: 4.9 CM^2
BH CV ECHO MEAS - AO ROOT DIAM: 2.5 CM
BH CV ECHO MEAS - AO V2 MAX: 141.5 CM/SEC
BH CV ECHO MEAS - AO V2 MEAN: 91.5 CM/SEC
BH CV ECHO MEAS - AO V2 VTI: 23.4 CM
BH CV ECHO MEAS - AVA(I,A): 3.7 CM^2
BH CV ECHO MEAS - AVA(I,D): 3.7 CM^2
BH CV ECHO MEAS - AVA(V,A): 3.1 CM^2
BH CV ECHO MEAS - AVA(V,D): 3.1 CM^2
BH CV ECHO MEAS - BSA(HAYCOCK): 2.5 M^2
BH CV ECHO MEAS - BSA: 2.4 M^2
BH CV ECHO MEAS - BZI_BMI: 35.5 KILOGRAMS/M^2
BH CV ECHO MEAS - BZI_METRIC_HEIGHT: 185.4 CM
BH CV ECHO MEAS - BZI_METRIC_WEIGHT: 122 KG
BH CV ECHO MEAS - EDV(CUBED): 97.3 ML
BH CV ECHO MEAS - EDV(MOD-SP4): 161 ML
BH CV ECHO MEAS - EDV(TEICH): 97.3 ML
BH CV ECHO MEAS - EF(CUBED): 59.6 %
BH CV ECHO MEAS - EF(MOD-SP4): 64 %
BH CV ECHO MEAS - EF(TEICH): 51.3 %
BH CV ECHO MEAS - ESV(CUBED): 39.3 ML
BH CV ECHO MEAS - ESV(MOD-SP4): 58 ML
BH CV ECHO MEAS - ESV(TEICH): 47.4 ML
BH CV ECHO MEAS - FS: 26.1 %
BH CV ECHO MEAS - IVS/LVPW: 0.75
BH CV ECHO MEAS - IVSD: 1.1 CM
BH CV ECHO MEAS - IVSS: 1.3 CM
BH CV ECHO MEAS - LA DIMENSION: 4.6 CM
BH CV ECHO MEAS - LA/AO: 1.8
BH CV ECHO MEAS - LAD MAJOR: 4.6 CM
BH CV ECHO MEAS - LAT PEAK E' VEL: 10.2 CM/SEC
BH CV ECHO MEAS - LATERAL E/E' RATIO: 8.2
BH CV ECHO MEAS - LV DIASTOLIC VOL/BSA (35-75): 66 ML/M^2
BH CV ECHO MEAS - LV IVRT: 0.11 SEC
BH CV ECHO MEAS - LV MASS(C)D: 211.2 GRAMS
BH CV ECHO MEAS - LV MASS(C)DI: 86.5 GRAMS/M^2
BH CV ECHO MEAS - LV MASS(C)S: 191.2 GRAMS
BH CV ECHO MEAS - LV MASS(C)SI: 78.3 GRAMS/M^2
BH CV ECHO MEAS - LV MAX PG: 6.5 MMHG
BH CV ECHO MEAS - LV MEAN PG: 3 MMHG
BH CV ECHO MEAS - LV SYSTOLIC VOL/BSA (12-30): 23.8 ML/M^2
BH CV ECHO MEAS - LV V1 MAX: 127.5 CM/SEC
BH CV ECHO MEAS - LV V1 MEAN: 73.8 CM/SEC
BH CV ECHO MEAS - LV V1 VTI: 24.9 CM
BH CV ECHO MEAS - LVIDD: 4.6 CM
BH CV ECHO MEAS - LVIDS: 3.4 CM
BH CV ECHO MEAS - LVLD AP4: 9.2 CM
BH CV ECHO MEAS - LVLS AP4: 7.8 CM
BH CV ECHO MEAS - LVOT AREA (M): 3.5 CM^2
BH CV ECHO MEAS - LVOT AREA: 3.5 CM^2
BH CV ECHO MEAS - LVOT DIAM: 2.1 CM
BH CV ECHO MEAS - LVPWD: 1.1 CM
BH CV ECHO MEAS - LVPWS: 1.8 CM
BH CV ECHO MEAS - MED PEAK E' VEL: 9 CM/SEC
BH CV ECHO MEAS - MEDIAL E/E' RATIO: 9.3
BH CV ECHO MEAS - MV A MAX VEL: 78.4 CM/SEC
BH CV ECHO MEAS - MV DEC SLOPE: 342.5 CM/SEC^2
BH CV ECHO MEAS - MV DEC TIME: 0.27 SEC
BH CV ECHO MEAS - MV E MAX VEL: 83.2 CM/SEC
BH CV ECHO MEAS - MV E/A: 1.1
BH CV ECHO MEAS - MV MAX PG: 3.7 MMHG
BH CV ECHO MEAS - MV MEAN PG: 2 MMHG
BH CV ECHO MEAS - MV P1/2T MAX VEL: 81.1 CM/SEC
BH CV ECHO MEAS - MV P1/2T: 69.4 MSEC
BH CV ECHO MEAS - MV V2 MAX: 96.5 CM/SEC
BH CV ECHO MEAS - MV V2 MEAN: 58.3 CM/SEC
BH CV ECHO MEAS - MV V2 VTI: 29 CM
BH CV ECHO MEAS - MVA P1/2T LCG: 2.7 CM^2
BH CV ECHO MEAS - MVA(P1/2T): 3.2 CM^2
BH CV ECHO MEAS - MVA(VTI): 3 CM^2
BH CV ECHO MEAS - PA MAX PG: 3.8 MMHG
BH CV ECHO MEAS - PA V2 MAX: 96.9 CM/SEC
BH CV ECHO MEAS - SI(AO): 47.1 ML/M^2
BH CV ECHO MEAS - SI(CUBED): 23.8 ML/M^2
BH CV ECHO MEAS - SI(LVOT): 35.3 ML/M^2
BH CV ECHO MEAS - SI(MOD-SP4): 42.2 ML/M^2
BH CV ECHO MEAS - SI(TEICH): 20.4 ML/M^2
BH CV ECHO MEAS - SV(AO): 114.9 ML
BH CV ECHO MEAS - SV(CUBED): 58 ML
BH CV ECHO MEAS - SV(LVOT): 86.2 ML
BH CV ECHO MEAS - SV(MOD-SP4): 103 ML
BH CV ECHO MEAS - SV(TEICH): 49.9 ML
BH CV ECHO MEAS - TR MAX PG: 6 MMHG
BH CV ECHO MEAS - TR MAX VEL: 118.5 CM/SEC
BH CV ECHO MEAS - TV MAX PG: 1.9 MMHG
BH CV ECHO MEAS - TV V2 MAX: 68.8 CM/SEC
BH CV ECHO MEASUREMENTS AVERAGE E/E' RATIO: 8.67
LEFT ATRIUM VOLUME INDEX: 22.9 ML/M^2
LEFT ATRIUM VOLUME: 56 ML
LV EF 2D ECHO EST: 56 %

## 2020-10-28 ENCOUNTER — OFFICE VISIT (OUTPATIENT)
Dept: CARDIOLOGY | Facility: CLINIC | Age: 50
End: 2020-10-28

## 2020-10-28 VITALS
DIASTOLIC BLOOD PRESSURE: 86 MMHG | HEART RATE: 85 BPM | BODY MASS INDEX: 35.78 KG/M2 | OXYGEN SATURATION: 97 % | SYSTOLIC BLOOD PRESSURE: 130 MMHG | WEIGHT: 270 LBS | HEIGHT: 73 IN

## 2020-10-28 DIAGNOSIS — R06.09 DOE (DYSPNEA ON EXERTION): ICD-10-CM

## 2020-10-28 DIAGNOSIS — R07.2 PRECORDIAL PAIN: Primary | ICD-10-CM

## 2020-10-28 DIAGNOSIS — R00.2 PALPITATIONS: ICD-10-CM

## 2020-10-28 PROCEDURE — 99214 OFFICE O/P EST MOD 30 MIN: CPT | Performed by: INTERNAL MEDICINE

## 2020-10-28 NOTE — PROGRESS NOTES
"    Subjective:     Encounter Date:10/28/2020      Patient ID: Fahad Mercedes is a 50 y.o. male.    Chief Complaint:  HPI    The following portions of the patient's history were reviewed and updated as appropriate: allergies, current medications, past family history, past medical history, past social history, past surgical history and problem  ROS        Current Outpatient Medications:   •  acetaminophen (TYLENOL) 325 MG tablet, Take 650 mg by mouth Every 6 (Six) Hours As Needed for Mild Pain ., Disp: , Rfl:   •  gabapentin (NEURONTIN) 800 MG tablet, 800 mg 3 (Three) Times a Day., Disp: , Rfl:   •  Nerve Stimulator (TENS THERAPY PAIN RELIEF) device, TENS unit and electrodes, Disp: , Rfl:   •  tiZANidine (ZANAFLEX) 4 MG tablet, 2 mg As Needed., Disp: , Rfl:   •  vitamin C (ASCORBIC ACID) 500 MG tablet, Take 1,000 mg by mouth 2 (two) times a day., Disp: , Rfl:     Objective:   Physical Exam  Blood pressure 130/86, pulse 85, height 185.4 cm (73\"), weight 122 kg (270 lb), SpO2 97 %.   Lab Review:     Assessment:       1. Precordial pain  ***    2. BROWN (dyspnea on exertion)  ***    3. Palpitations  ***    Procedures    Plan:     ***      "

## 2020-10-28 NOTE — PROGRESS NOTES
"    Subjective:     Encounter Date:10/28/2020      Patient ID: Fahad Mercedes is a 50 y.o. male.    Chief Complaint: Chest discomfort and shortness of breath  HPI  This is a 50-year-old male patient who underwent a battery of outpatient testing for shortness of breath and atypical chest pain.  The patient underwent a vasodilator nuclear stress test showing no evidence of ischemia or infarction.  He had a normal calculated ejection fraction.  The patient underwent an echocardiogram showing normal left ventricular systolic and diastolic function.  There was no evidence of significant valvular pathology or pericardial disease.  There was no evidence of pulmonary hypertension.  The patient reports that his symptoms are typically a severe indigestion that is refractory to multiple antiacid therapies.  He underwent upper endoscopy demonstrating a small hiatal hernia.  The patient has also noted a bulging of the midline of his upper abdomen while attempting to do \"sit ups\".  He reports that his shortness of breath remains unchanged and is present with instrumental activities of daily living.  He indicates that he has successfully discontinued cigarette smoking.  He has a long history of heavy tobacco abuse.  He has no orthopnea PND or lower extremity edema.  He has had no dizziness palpitations or syncope.  The following portions of the patient's history were reviewed and updated as appropriate: allergies, current medications, past family history, past medical history, past social history, past surgical history and problem  Review of Systems   Constitution: Negative for chills, diaphoresis, fever, malaise/fatigue, weight gain and weight loss.   HENT: Negative for ear discharge, hearing loss, hoarse voice and nosebleeds.    Eyes: Negative for discharge, double vision, pain and photophobia.   Cardiovascular: Positive for chest pain and dyspnea on exertion. Negative for claudication, cyanosis, irregular heartbeat, leg " swelling, near-syncope, orthopnea, palpitations, paroxysmal nocturnal dyspnea and syncope.   Respiratory: Negative for cough, hemoptysis, sputum production and wheezing.    Endocrine: Negative for cold intolerance, heat intolerance, polydipsia, polyphagia and polyuria.   Hematologic/Lymphatic: Negative for adenopathy and bleeding problem. Does not bruise/bleed easily.   Skin: Negative for color change, flushing, itching and rash.   Musculoskeletal: Negative for muscle cramps, muscle weakness, myalgias and stiffness.   Gastrointestinal: Negative for abdominal pain, diarrhea, hematemesis, hematochezia, nausea and vomiting.   Genitourinary: Negative for dysuria, frequency and nocturia.   Neurological: Negative for focal weakness, loss of balance, numbness, paresthesias and seizures.   Psychiatric/Behavioral: Negative for altered mental status, hallucinations and suicidal ideas.   Allergic/Immunologic: Negative for HIV exposure, hives and persistent infections.           Current Outpatient Medications:   •  acetaminophen (TYLENOL) 325 MG tablet, Take 650 mg by mouth Every 6 (Six) Hours As Needed for Mild Pain ., Disp: , Rfl:   •  gabapentin (NEURONTIN) 800 MG tablet, 800 mg 3 (Three) Times a Day., Disp: , Rfl:   •  Nerve Stimulator (TENS THERAPY PAIN RELIEF) device, TENS unit and electrodes, Disp: , Rfl:   •  tiZANidine (ZANAFLEX) 4 MG tablet, 2 mg As Needed., Disp: , Rfl:   •  vitamin C (ASCORBIC ACID) 500 MG tablet, Take 1,000 mg by mouth 2 (two) times a day., Disp: , Rfl:     Objective:   Vitals signs and nursing note reviewed.   Constitutional:       Appearance: Healthy appearance. Not in distress.   Eyes:      Conjunctiva/sclera: Conjunctivae normal.   Neck:      Musculoskeletal: Neck supple.      Thyroid: No thyromegaly.      Vascular: No JVR. JVD normal.   Pulmonary:      Effort: Pulmonary effort is normal.      Breath sounds: Normal breath sounds. No wheezing. No rhonchi. No rales.   Chest:      Chest wall: Not  "tender to palpatation.   Cardiovascular:      PMI at left midclavicular line. Normal rate. Regular rhythm. Normal S1. Normal S2.      Murmurs: There is no murmur.      No gallop. No click. No rub.   Pulses:     Intact distal pulses.   Edema:     Peripheral edema absent.   Abdominal:      General: Bowel sounds are normal.      Palpations: Abdomen is soft.      Tenderness: There is abdominal tenderness.      Hernia: A hernia is present.   Musculoskeletal: Normal range of motion.         General: No tenderness.   Skin:     General: Skin is warm and dry.   Neurological:      General: No focal deficit present.      Mental Status: Alert and oriented to person, place and time.      Cranial Nerves: Cranial nerves are intact.      Motor: Motor function is intact.      Gait: Gait is intact.       Blood pressure 130/86, pulse 85, height 185.4 cm (73\"), weight 122 kg (270 lb), SpO2 97 %.   Lab Review:     Assessment:       1. Precordial pain  Noncardiac chest pain.  No inducible myocardial ischemia.  Structurally normal heart.  Suspect esophageal etiology.    2. BROWN (dyspnea on exertion)  Suspect a pulmonary etiology given his longstanding history of cigarette smoking.  Fortunately he has been able to discontinue tobacco abuse.    3. Palpitations  Spontaneous resolution without specific intervention.  The patient did not wear a cardiac monitor as ordered.    Procedures    Plan:     We will make a referral to our local gastroenterologist.  The patient did not follow-up with the gastroenterologist after his upper endoscopy due to Covid restrictions.  We will make a referral to the general surgeon for his midline abdominal hernia as the patient does have discomfort and tenderness to palpation along the hernia.  There is no evidence of incarcerated bowel and the \"bulging\" that occurs with increased intra-abdominal pressure spontaneously reduces.  We will make a referral to the local pulmonologist for formal pulmonary evaluation.  " The patient has been congratulated on his smoking cessation and cautioned to never again resume cigarette smoking.  The patient is congratulated on his weight loss and encouraged to continue these efforts.  He is encouraged to maintain an active lifestyle.  No additional cardiovascular testing is warranted.  No changes in his medications have been made at today's visit.

## 2020-11-09 ENCOUNTER — OFFICE VISIT (OUTPATIENT)
Dept: SURGERY | Facility: CLINIC | Age: 50
End: 2020-11-09

## 2020-11-09 VITALS
HEIGHT: 73 IN | SYSTOLIC BLOOD PRESSURE: 142 MMHG | DIASTOLIC BLOOD PRESSURE: 88 MMHG | TEMPERATURE: 98.4 F | OXYGEN SATURATION: 99 % | BODY MASS INDEX: 36.05 KG/M2 | HEART RATE: 85 BPM | WEIGHT: 272 LBS

## 2020-11-09 DIAGNOSIS — K21.00 GASTROESOPHAGEAL REFLUX DISEASE WITH ESOPHAGITIS WITHOUT HEMORRHAGE: ICD-10-CM

## 2020-11-09 DIAGNOSIS — G47.30 SLEEP APNEA, UNSPECIFIED TYPE: Primary | ICD-10-CM

## 2020-11-09 DIAGNOSIS — M62.08 RECTUS DIASTASIS: Primary | ICD-10-CM

## 2020-11-09 PROCEDURE — 99203 OFFICE O/P NEW LOW 30 MIN: CPT | Performed by: SURGERY

## 2020-11-09 RX ORDER — ATORVASTATIN CALCIUM 20 MG/1
TABLET, FILM COATED ORAL
COMMUNITY

## 2020-11-09 RX ORDER — OMEPRAZOLE 40 MG/1
40 CAPSULE, DELAYED RELEASE ORAL DAILY
Qty: 30 CAPSULE | Refills: 11 | Status: SHIPPED | OUTPATIENT
Start: 2020-11-09 | End: 2021-11-09

## 2020-11-09 NOTE — PROGRESS NOTES
Patient: Fahad Mercedes    YOB: 1970    Date: 11/09/2020    Primary Care Provider: Ana María Laguna APRN    Chief Complaint   Patient presents with   • Consult     hernia       SUBJECTIVE:    History of present illness:  I saw the patient in the office today as a consultation for evaluation and treatment of his hernia. States he has trouble breathing and has been having some dysphagia.  Patient saw cardiology, he has been cleared for with no evidence of coronary artery disease.  He has upper respiratory issues and difficulty catching his breath at night, difficulty breathing while he sleeping.  No significant aspiration or reflux.  He does have heartburn symptoms, takes over-the-counter antacids as needed.  EGD and colonoscopy 6 months ago indicated a hiatal hernia.    The following portions of the patient's history were reviewed and updated as appropriate: allergies, current medications, past family history, past medical history, past social history, past surgical history and problem list.    Review of Systems   Constitutional: Negative for chills, fever and unexpected weight change.   HENT: Positive for trouble swallowing. Negative for voice change.    Eyes: Negative for visual disturbance.   Respiratory: Negative for apnea, cough, chest tightness, shortness of breath and wheezing.    Cardiovascular: Negative for chest pain, palpitations and leg swelling.   Gastrointestinal: Positive for abdominal pain. Negative for abdominal distention, anal bleeding, blood in stool, constipation, diarrhea, nausea, rectal pain and vomiting.   Endocrine: Negative for cold intolerance and heat intolerance.   Genitourinary: Negative for difficulty urinating, dysuria, flank pain, scrotal swelling and testicular pain.   Musculoskeletal: Negative for back pain, gait problem and joint swelling.   Skin: Negative for color change, rash and wound.   Neurological: Negative for dizziness, syncope, speech difficulty,  weakness, numbness and headaches.   Hematological: Negative for adenopathy. Does not bruise/bleed easily.   Psychiatric/Behavioral: Negative for confusion. The patient is not nervous/anxious.        History:  Past Medical History:   Diagnosis Date   • Abdominal pain    • Chest pain     from smoking   • Colon polyps    • Constipation    • Diverticula of intestine    • Dysphagia    • Fatigue    • FHx: colon cancer    • GERD (gastroesophageal reflux disease)    • [place] hearing[place]     no aids worn right ear is worse than left   • Hypertension    • Kidney stones     passed without surgery   • Lower back pain    • Nausea    • Palpitations    • Sciatica     right leg   • Wears glasses     reading glasses only       Past Surgical History:   Procedure Laterality Date   • BACK SURGERY     • COLONOSCOPY     • COLONOSCOPY N/A 2/20/2020    Procedure: COLONOSCOPY;  Surgeon: Alyx Malave MD;  Location: [facility] ENDOSCOPY;  Service: Gastroenterology;  Laterality: N/A;   • ENDOSCOPY N/A 2/20/2020    Procedure: ESOPHAGOGASTRODUODENOSCOPY;  Surgeon: Alyx Malave MD;  Location: [facility] ENDOSCOPY;  Service: Gastroenterology;  Laterality: N/A;   • EXTERNAL EAR SURGERY     • LASIK     • SPINAL CORD STIMULATOR IMPLANT     • TONSILLECTOMY     • WISDOM TOOTH EXTRACTION         Family History   Problem Relation Age of Onset   • No Known Problems Mother    • No Known Problems Father        Social History     Tobacco Use   • Smoking status: Former Smoker     Packs/day: 1.00     Types: Cigarettes   • Smokeless tobacco: Former User     Types: Snuff   Substance Use Topics   • Alcohol use: Not Currently     Comment: use to use when younger   • Drug use: Yes     Types: Hydrocodone, Marijuana     Comment: cbd oil with thc now       Allergies:  No Known Allergies    Medications:    Current Outpatient Medications:   •  acetaminophen (TYLENOL) 325 MG tablet, Take 650 mg by mouth Every 6 (Six) Hours As Needed for Mild Pain ., Disp:  ", Rfl:   •  atorvastatin (LIPITOR) 20 MG tablet, atorvastatin 20 mg tablet, Disp: , Rfl:   •  gabapentin (NEURONTIN) 800 MG tablet, 800 mg 3 (Three) Times a Day., Disp: , Rfl:   •  Nerve Stimulator (TENS THERAPY PAIN RELIEF) device, TENS unit and electrodes, Disp: , Rfl:   •  tiZANidine (ZANAFLEX) 4 MG tablet, 2 mg As Needed., Disp: , Rfl:   •  vitamin C (ASCORBIC ACID) 500 MG tablet, Take 1,000 mg by mouth 2 (two) times a day., Disp: , Rfl:   •  omeprazole (PrilOSEC) 40 MG capsule, Take 1 capsule by mouth Daily., Disp: 30 capsule, Rfl: 11    OBJECTIVE:    Vital Signs:   Vitals:    11/09/20 1431   BP: 142/88   Pulse: 85   Temp: 98.4 °F (36.9 °C)   SpO2: 99%   Weight: 123 kg (272 lb)   Height: 185.4 cm (73\")       Physical Exam:   General Appearance:    Alert, cooperative, in no acute distress   Head:    Normocephalic, without obvious abnormality, atraumatic   Eyes:            Lids and lashes normal, conjunctivae and sclerae normal, no   icterus, no pallor, corneas clear, PERRLA   Ears:    Ears appear intact with no abnormalities noted   Throat:   No oral lesions, no thrush, oral mucosa moist   Neck:   No adenopathy, supple, trachea midline, no thyromegaly, no   carotid bruit, no JVD   Lungs:     Clear to auscultation,respirations regular, even and                  unlabored    Heart:    Regular rhythm and normal rate, normal S1 and S2, no            murmur   Abdomen:     no masses, no organomegaly, soft non-tender, non-distended, no guarding, there is evidence of a large abdominal wall diastases.  No definite hernia defect palpated.   Extremities:   Moves all extremities well, no edema, no cyanosis, no             redness   Pulses:   Pulses palpable and equal bilaterally   Skin:   No bleeding, bruising or rash   Lymph nodes:   No palpable adenopathy   Neurologic:   Cranial nerves 2 - 12 grossly intact, sensation intact        Results Review:   I reviewed the patient's new clinical results.    Review of Systems was " reviewed and confirmed as accurate as documented by the MA.    ASSESSMENT/PLAN:    1. Rectus diastasis    2. Gastroesophageal reflux disease with esophagitis without hemorrhage        Patient reassured, abdominal wall diastases does not need repair.  Recommend he start taking Prilosec 40 mg daily to alleviate symptoms.  Avoid caffeine alcohol and nicotine.  Patient referred to pulmonology for sleep study to address his issues with breathing especially at nighttime.  The patient understands, agrees, and had no questions for me at the end of the office visit.     I discussed the patients findings and my recommendations with patient.    Electronically signed by Giorgi Lizama MD  11/09/20

## 2021-03-01 ENCOUNTER — OFFICE VISIT (OUTPATIENT)
Dept: INTERNAL MEDICINE | Facility: CLINIC | Age: 51
End: 2021-03-01

## 2021-03-01 VITALS
DIASTOLIC BLOOD PRESSURE: 82 MMHG | TEMPERATURE: 97.5 F | SYSTOLIC BLOOD PRESSURE: 132 MMHG | BODY MASS INDEX: 34.03 KG/M2 | HEART RATE: 97 BPM | WEIGHT: 256.8 LBS | OXYGEN SATURATION: 96 % | HEIGHT: 73 IN

## 2021-03-01 DIAGNOSIS — R05.8 COUGH PRESENT FOR GREATER THAN 3 WEEKS: ICD-10-CM

## 2021-03-01 DIAGNOSIS — K21.9 GASTROESOPHAGEAL REFLUX DISEASE, UNSPECIFIED WHETHER ESOPHAGITIS PRESENT: ICD-10-CM

## 2021-03-01 DIAGNOSIS — R06.09 DOE (DYSPNEA ON EXERTION): Primary | ICD-10-CM

## 2021-03-01 DIAGNOSIS — K57.90 DIVERTICULOSIS: ICD-10-CM

## 2021-03-01 DIAGNOSIS — R10.31 ABDOMINAL PAIN, RLQ: ICD-10-CM

## 2021-03-01 DIAGNOSIS — Z87.891 HISTORY OF TOBACCO USE IN PAST YEAR: ICD-10-CM

## 2021-03-01 DIAGNOSIS — K76.0 FATTY LIVER: ICD-10-CM

## 2021-03-01 DIAGNOSIS — R73.03 PREDIABETES: ICD-10-CM

## 2021-03-01 LAB — HBA1C MFR BLD: 5.9 %

## 2021-03-01 PROCEDURE — 99214 OFFICE O/P EST MOD 30 MIN: CPT | Performed by: NURSE PRACTITIONER

## 2021-03-01 PROCEDURE — 83036 HEMOGLOBIN GLYCOSYLATED A1C: CPT | Performed by: NURSE PRACTITIONER

## 2021-03-01 NOTE — PROGRESS NOTES
Chief Complaint / Reason:      Chief Complaint   Patient presents with   • Diverticulitis       Subjective     HPI  Patient presents today with complaints of ongoing shortness of breath he has been evaluated by cardiology.  He states that he did quit smoking in April last year and has lost weight.  He states that he has been doing dietary modifications along with trying to exercise more and be more active.  Also he was prediabetic and states that he has not been eating a lot of breads pastas or carbohydrates.  He also quit taking CBD oil.  Patient has had some occasional right lower quadrant pain but denies any blood in urine or stool.  He did have CT of abdomen pelvis in 2018 which showed fatty liver diverticulosis and does have history of constipation and hernia.  Denies any fever or chills, nausea or vomiting.  Patient does states that the reflux is worse at night when he lies down.  History taken from: patient    PMH/FH/Social History were reviewed and updated appropriately in the electronic medical record.   Past Medical History:   Diagnosis Date   • Abdominal pain    • Chest pain     from smoking   • Colon polyps    • Constipation    • Diverticula of intestine    • Dysphagia    • Fatigue    • FHx: colon cancer    • GERD (gastroesophageal reflux disease)    • [place] (hard of hearing)     no aids worn right ear is worse than left   • Hypertension    • Kidney stones     passed without surgery   • Lower back pain    • Nausea    • Palpitations    • Sciatica     right leg   • Wears glasses     reading glasses only     Past Surgical History:   Procedure Laterality Date   • BACK SURGERY     • COLONOSCOPY     • COLONOSCOPY N/A 2/20/2020    Procedure: COLONOSCOPY;  Surgeon: Alyx Malave MD;  Location: [facility] ENDOSCOPY;  Service: Gastroenterology;  Laterality: N/A;   • ENDOSCOPY N/A 2/20/2020    Procedure: ESOPHAGOGASTRODUODENOSCOPY;  Surgeon: Alyx Malave MD;  Location: [facility] ENDOSCOPY;  Service:  Gastroenterology;  Laterality: N/A;   • EXTERNAL EAR SURGERY     • LASIK     • SPINAL CORD STIMULATOR IMPLANT     • TONSILLECTOMY     • WISDOM TOOTH EXTRACTION       Social History     Socioeconomic History   • Marital status: Single     Spouse name: Not on file   • Number of children: Not on file   • Years of education: Not on file   • Highest education level: Not on file   Tobacco Use   • Smoking status: Former Smoker     Packs/day: 0.00     Types: Cigarettes   • Smokeless tobacco: Former User   Substance and Sexual Activity   • Alcohol use: Not Currently     Comment: use to use when younger   • Drug use: Not Currently     Comment: cbd oil with thc now   • Sexual activity: Defer     Family History   Problem Relation Age of Onset   • No Known Problems Mother    • No Known Problems Father        Review of Systems:   Review of Systems   Constitutional: Positive for fatigue.   Respiratory: Positive for cough and shortness of breath.    Cardiovascular: Negative.    Gastrointestinal: Positive for abdominal pain, constipation and GERD.   Allergic/Immunologic: Positive for environmental allergies.   Neurological: Negative.    Psychiatric/Behavioral: Positive for stress.         All other systems were reviewed and are negative.  Exceptions are noted in the subjective or above.      Objective     Vital Signs  Vitals:    03/01/21 0908   BP: 132/82   Pulse: 97   Temp: 97.5 °F (36.4 °C)   SpO2: 96%       Body mass index is 33.88 kg/m².    Physical Exam  Vitals signs and nursing note reviewed.   Constitutional:       General: He is not in acute distress.     Appearance: He is well-developed. He is not diaphoretic.   HENT:      Head: Normocephalic.   Cardiovascular:      Rate and Rhythm: Normal rate and regular rhythm.      Pulses: Normal pulses.      Heart sounds: Normal heart sounds. No murmur.   Pulmonary:      Effort: Pulmonary effort is normal. No respiratory distress.      Breath sounds: Normal breath sounds.   Chest:       Chest wall: No tenderness.   Abdominal:      Tenderness: There is abdominal tenderness in the right lower quadrant. There is no right CVA tenderness, left CVA tenderness, guarding or rebound. Negative signs include Garcia's sign, Rovsing's sign, McBurney's sign, psoas sign and obturator sign.   Skin:     General: Skin is warm and dry.      Capillary Refill: Capillary refill takes less than 2 seconds.   Neurological:      Mental Status: He is alert and oriented to person, place, and time.   Psychiatric:         Behavior: Behavior normal.         Thought Content: Thought content normal.         Judgment: Judgment normal.              Results Review:    I reviewed the patient's new clinical results.   Office Visit on 03/01/2021   Component Date Value Ref Range Status   • Hemoglobin A1C 03/01/2021 5.9  % Final         Medication Review:     Current Outpatient Medications:   •  atorvastatin (LIPITOR) 20 MG tablet, atorvastatin 20 mg tablet, Disp: , Rfl:   •  gabapentin (NEURONTIN) 800 MG tablet, 800 mg 3 (Three) Times a Day., Disp: , Rfl:   •  Nerve Stimulator (TENS THERAPY PAIN RELIEF) device, TENS unit and electrodes, Disp: , Rfl:   •  omeprazole (PrilOSEC) 40 MG capsule, Take 1 capsule by mouth Daily., Disp: 30 capsule, Rfl: 11  •  tiZANidine (ZANAFLEX) 4 MG tablet, 2 mg As Needed., Disp: , Rfl:   •  vitamin C (ASCORBIC ACID) 500 MG tablet, Take 1,000 mg by mouth 2 (two) times a day., Disp: , Rfl:   •  acetaminophen (TYLENOL) 325 MG tablet, Take 650 mg by mouth Every 6 (Six) Hours As Needed for Mild Pain ., Disp: , Rfl:     Diagnoses and all orders for this visit:    BROWN (dyspnea on exertion)  -     CT Chest Without Contrast    Cough present for greater than 3 weeks  -     CT Chest Without Contrast  Could be related to reflux and advised patient to elevate HOB  History of tobacco use in past year  -     CT Chest Without Contrast    Prediabetes  -     POC Glycosylated Hemoglobin (Hb A1C)  Stable    Diverticulosis  Recommend dietary modifications to prevent diverticulitis  Gastroesophageal reflux disease, unspecified whether esophagitis present  Avoid eating spicy foods  Avoid caffeinated beverages  Avoid carbonated beverages  Do not eat or drink within 2-3 hours of going to bed in the evening  Elevate head of bed on 4-6 inch blocks  Eat smaller portions of food throughout the day    Abdominal pain, RLQ  Discussed worsening signs and symptoms along with contributing factors.  Recommend avoiding constipation  Unable to obtain urine for urinalysis.  Fatty liver  Advised patient to continue weight loss and exercise to improve fatty liver        Return in about 3 months (around 6/1/2021), or if symptoms worsen or fail to improve.    Ana María Laguna, APRN  03/01/2021

## 2021-03-09 ENCOUNTER — HOSPITAL ENCOUNTER (OUTPATIENT)
Dept: CT IMAGING | Facility: HOSPITAL | Age: 51
End: 2021-03-09

## 2021-04-08 ENCOUNTER — HOSPITAL ENCOUNTER (OUTPATIENT)
Dept: GENERAL RADIOLOGY | Facility: HOSPITAL | Age: 51
Discharge: HOME | End: 2021-04-08
Admitting: NURSE PRACTITIONER

## 2021-04-08 ENCOUNTER — TRANSCRIBE ORDERS (OUTPATIENT)
Dept: GENERAL RADIOLOGY | Facility: HOSPITAL | Age: 51
End: 2021-04-08

## 2021-04-08 DIAGNOSIS — M54.16 RADICULOPATHY, LUMBAR REGION: ICD-10-CM

## 2021-04-08 DIAGNOSIS — M54.16 RADICULOPATHY, LUMBAR REGION: Primary | ICD-10-CM

## 2021-04-08 PROCEDURE — 72070 X-RAY EXAM THORAC SPINE 2VWS: CPT

## 2021-06-21 ENCOUNTER — OFFICE VISIT (OUTPATIENT)
Dept: INTERNAL MEDICINE | Facility: CLINIC | Age: 51
End: 2021-06-21

## 2021-06-21 VITALS
HEIGHT: 73 IN | TEMPERATURE: 97.7 F | HEART RATE: 81 BPM | BODY MASS INDEX: 34.46 KG/M2 | RESPIRATION RATE: 16 BRPM | WEIGHT: 260 LBS | OXYGEN SATURATION: 96 % | DIASTOLIC BLOOD PRESSURE: 81 MMHG | SYSTOLIC BLOOD PRESSURE: 115 MMHG

## 2021-06-21 DIAGNOSIS — Z13.21 SCREENING FOR ENDOCRINE, NUTRITIONAL, METABOLIC AND IMMUNITY DISORDER: ICD-10-CM

## 2021-06-21 DIAGNOSIS — Z87.891 HX OF TOBACCO USE, PRESENTING HAZARDS TO HEALTH: ICD-10-CM

## 2021-06-21 DIAGNOSIS — K21.9 GASTROESOPHAGEAL REFLUX DISEASE, UNSPECIFIED WHETHER ESOPHAGITIS PRESENT: ICD-10-CM

## 2021-06-21 DIAGNOSIS — Z13.0 SCREENING FOR ENDOCRINE, NUTRITIONAL, METABOLIC AND IMMUNITY DISORDER: ICD-10-CM

## 2021-06-21 DIAGNOSIS — Z13.29 SCREENING FOR ENDOCRINE, NUTRITIONAL, METABOLIC AND IMMUNITY DISORDER: ICD-10-CM

## 2021-06-21 DIAGNOSIS — R73.09 ABNORMAL GLUCOSE: ICD-10-CM

## 2021-06-21 DIAGNOSIS — R06.09 DOE (DYSPNEA ON EXERTION): ICD-10-CM

## 2021-06-21 DIAGNOSIS — E78.5 DYSLIPIDEMIA: ICD-10-CM

## 2021-06-21 DIAGNOSIS — M54.50 CHRONIC BILATERAL LOW BACK PAIN WITHOUT SCIATICA: ICD-10-CM

## 2021-06-21 DIAGNOSIS — E66.9 CLASS 1 OBESITY WITH SERIOUS COMORBIDITY AND BODY MASS INDEX (BMI) OF 34.0 TO 34.9 IN ADULT, UNSPECIFIED OBESITY TYPE: ICD-10-CM

## 2021-06-21 DIAGNOSIS — E55.9 VITAMIN D INSUFFICIENCY: ICD-10-CM

## 2021-06-21 DIAGNOSIS — Z00.00 MEDICARE ANNUAL WELLNESS VISIT, SUBSEQUENT: Primary | ICD-10-CM

## 2021-06-21 DIAGNOSIS — Z13.228 SCREENING FOR ENDOCRINE, NUTRITIONAL, METABOLIC AND IMMUNITY DISORDER: ICD-10-CM

## 2021-06-21 DIAGNOSIS — G89.29 CHRONIC BILATERAL LOW BACK PAIN WITHOUT SCIATICA: ICD-10-CM

## 2021-06-21 PROBLEM — M54.16 LUMBAR RADICULOPATHY: Status: ACTIVE | Noted: 2021-04-07

## 2021-06-21 PROCEDURE — 99396 PREV VISIT EST AGE 40-64: CPT | Performed by: NURSE PRACTITIONER

## 2021-06-21 PROCEDURE — G0439 PPPS, SUBSEQ VISIT: HCPCS | Performed by: NURSE PRACTITIONER

## 2021-06-21 RX ORDER — FLUTICASONE PROPIONATE AND SALMETEROL XINAFOATE 115; 21 UG/1; UG/1
2 AEROSOL, METERED RESPIRATORY (INHALATION)
Qty: 12 G | Refills: 2 | Status: SHIPPED | OUTPATIENT
Start: 2021-06-21

## 2021-06-21 RX ORDER — ALBUTEROL SULFATE 90 UG/1
2 AEROSOL, METERED RESPIRATORY (INHALATION) EVERY 6 HOURS PRN
Qty: 18 G | Refills: 1 | Status: SHIPPED | OUTPATIENT
Start: 2021-06-21

## 2021-06-21 NOTE — PROGRESS NOTES
The ABCs of the Annual Wellness Visit  Subsequent Medicare Wellness Visit    Chief Complaint   Patient presents with   • Medicare Wellness-subsequent       Subjective   History of Present Illness:  Fahad Mercedes is a 51 y.o. male who presents for a Subsequent Medicare Wellness Visit.  He denies chest pain or heart palpitations but does report ongoing shortness of breath.  Patient did quit smoking and states that he sometimes feels worse than when he was smoking.  He is also lost weight.  Patient was previously referred to Dr. Del Castillo multiple times and did not keep appointment for evaluation of pulmonary status.  Oxygen saturation today is 96%.  He does cough and states the cough is productive at times.  He is concerned regarding hiatal hernia and is wondering if it needs to be fixed he did have EGD and colonoscopy in February 2020.  He denies any blood in stool or urine.  Patient did have esophagitis and gastritis without bleeding.    HEALTH RISK ASSESSMENT    Recent Hospitalizations:  No hospitalization(s) within the last year.    Current Medical Providers:  Patient Care Team:  Ana María Laguna APRN as PCP - General (Family Medicine)    Smoking Status:  Social History     Tobacco Use   Smoking Status Former Smoker   • Packs/day: 0.00   • Types: Cigarettes   Smokeless Tobacco Former User       Alcohol Consumption:  Social History     Substance and Sexual Activity   Alcohol Use Not Currently    Comment: use to use when younger       Depression Screen:   PHQ-2/PHQ-9 Depression Screening 6/21/2021   Little interest or pleasure in doing things 0   Feeling down, depressed, or hopeless 0   Total Score 0       Fall Risk Screen:  WILLIAM Fall Risk Assessment has not been completed.    Health Habits and Functional and Cognitive Screening:  Functional & Cognitive Status 6/21/2021   Do you have difficulty preparing food and eating? No   Do you have difficulty bathing yourself, getting dressed or grooming yourself? No    Do you have difficulty using the toilet? No   Do you have difficulty moving around from place to place? Yes   Do you have trouble with steps or getting out of a bed or a chair? Yes   Current Diet Unhealthy Diet   Dental Exam Up to date   Eye Exam Up to date   Exercise (times per week) 3 times per week   Current Exercises Include Walking   Current Exercise Activities Include -   Do you need help using the phone?  No   Are you deaf or do you have serious difficulty hearing?  Yes   Do you need help with transportation? No   Do you need help shopping? No   Do you need help preparing meals?  No   Do you need help with housework?  No   Do you need help with laundry? No   Do you need help taking your medications? No   Do you need help managing money? No   Do you ever drive or ride in a car without wearing a seat belt? No   Have you felt unusual stress, anger or loneliness in the last month? Yes   Who do you live with? Sibling   If you need help, do you have trouble finding someone available to you? No   Have you been bothered in the last four weeks by sexual problems? No   Do you have difficulty concentrating, remembering or making decisions? No         Does the patient have evidence of cognitive impairment? No    Asprin use counseling: discussed risk and benefits.  Age-appropriate Screening Schedule:  Refer to the list below for future screening recommendations based on patient's age, sex and/or medical conditions. Orders for these recommended tests are listed in the plan section. The patient has been provided with a written plan.    Health Maintenance   Topic Date Due   • TDAP/TD VACCINES (1 - Tdap) Never done   • ZOSTER VACCINE (1 of 2) Never done   • LIPID PANEL  06/22/2021   • INFLUENZA VACCINE  08/01/2021          The following portions of the patient's history were reviewed and updated as appropriate: allergies, current medications, past family history, past medical history, past social history, past surgical  history and problem list.    Outpatient Medications Prior to Visit   Medication Sig Dispense Refill   • acetaminophen (TYLENOL) 325 MG tablet Take 650 mg by mouth Every 6 (Six) Hours As Needed for Mild Pain .     • atorvastatin (LIPITOR) 20 MG tablet atorvastatin 20 mg tablet     • gabapentin (NEURONTIN) 800 MG tablet 800 mg 3 (Three) Times a Day.     • Nerve Stimulator (TENS THERAPY PAIN RELIEF) device TENS unit and electrodes     • omeprazole (PrilOSEC) 40 MG capsule Take 1 capsule by mouth Daily. 30 capsule 11   • tiZANidine (ZANAFLEX) 4 MG tablet 2 mg As Needed.     • vitamin C (ASCORBIC ACID) 500 MG tablet Take 1,000 mg by mouth 2 (two) times a day.       No facility-administered medications prior to visit.       Patient Active Problem List   Diagnosis   • Elevated glucose level   • Obesity due to excess calories   • Acute depression   • Herniation of intervertebral disc   • Long term current use of therapeutic drug   • Low back pain   • Chronic pain due to injury   • Degeneration of lumbar intervertebral disc   • Lumbar post-laminectomy syndrome   • Personal history of colonic polyps   • Family hx of colon cancer   • GERD (gastroesophageal reflux disease)   • Precordial pain   • BROWN (dyspnea on exertion)   • Palpitations   • Dyslipidemia   • Lumbar radiculopathy       Advanced Care Planning:  ACP discussion was declined by the patient. Patient does not have an advance directive, declines further assistance.    Review of Systems   Constitutional: Positive for fatigue. Negative for chills, fever, unexpected weight gain and unexpected weight loss.   HENT: Negative for congestion, ear pain, hearing loss, rhinorrhea, sinus pressure, sneezing, sore throat and trouble swallowing.    Eyes: Negative for discharge and itching.   Respiratory: Positive for shortness of breath and wheezing. Negative for cough and chest tightness.    Cardiovascular: Negative.  Negative for chest pain, palpitations and leg swelling.  "  Gastrointestinal: Positive for abdominal pain and GERD. Negative for blood in stool, constipation, diarrhea and vomiting.   Endocrine: Negative for polydipsia and polyuria.   Genitourinary: Negative for difficulty urinating, dysuria, frequency, hematuria, urgency and urinary incontinence.   Musculoskeletal: Positive for arthralgias and myalgias. Negative for back pain, gait problem and joint swelling.   Skin: Negative for rash and bruise.   Allergic/Immunologic: Positive for environmental allergies. Negative for immunocompromised state.   Neurological: Negative.  Negative for dizziness, syncope, weakness, light-headedness, numbness and headache.   Hematological: Does not bruise/bleed easily.   Psychiatric/Behavioral: Positive for sleep disturbance and stress. Negative for behavioral problems and dysphoric mood. The patient is not nervous/anxious.        Compared to one year ago, the patient feels his physical health is the same.  Compared to one year ago, the patient feels his mental health is better.    Reviewed chart for potential of high risk medication in the elderly: yes  Reviewed chart for potential of harmful drug interactions in the elderly:yes    Objective         Vitals:    06/21/21 1058   BP: 115/81   Pulse: 81   Resp: 16   Temp: 97.7 °F (36.5 °C)   SpO2: 96%   Weight: 118 kg (260 lb)   Height: 185.4 cm (73\")   PainSc:   8       Body mass index is 34.3 kg/m².  Discussed the patient's BMI with him. The BMI is above average; BMI management plan is completed.    Physical Exam  Vitals and nursing note reviewed.   Constitutional:       General: He is not in acute distress.     Appearance: He is well-developed.   HENT:      Head: Normocephalic and atraumatic.      Right Ear: Ear canal and external ear normal. No decreased hearing noted. Tympanic membrane is erythematous and bulging.      Left Ear: Ear canal and external ear normal. No decreased hearing noted. Tympanic membrane is erythematous and bulging.     "  Nose: Mucosal edema present. No rhinorrhea.      Right Sinus: No maxillary sinus tenderness or frontal sinus tenderness.      Left Sinus: No maxillary sinus tenderness or frontal sinus tenderness.      Mouth/Throat:      Mouth: Mucous membranes are dry.      Dentition: Normal dentition.      Pharynx: Posterior oropharyngeal erythema present.      Comments: PND    Eyes:      General: Lids are normal.      Conjunctiva/sclera: Conjunctivae normal.      Pupils: Pupils are equal, round, and reactive to light.   Neck:      Thyroid: No thyroid mass or thyromegaly.      Vascular: No carotid bruit or JVD.   Cardiovascular:      Rate and Rhythm: Normal rate and regular rhythm.      Pulses: Normal pulses.      Heart sounds: Normal heart sounds, S1 normal and S2 normal. No murmur heard.     Pulmonary:      Effort: Pulmonary effort is normal. No respiratory distress.      Breath sounds: Normal breath sounds.   Abdominal:      General: Bowel sounds are normal. There is no abdominal bruit.      Palpations: Abdomen is soft. There is no mass.      Tenderness: There is abdominal tenderness in the right upper quadrant and epigastric area. There is no right CVA tenderness, left CVA tenderness, guarding or rebound. Negative signs include Garcia's sign, Rovsing's sign, McBurney's sign, psoas sign and obturator sign.   Genitourinary:     Comments: Deferred   Musculoskeletal:         General: Tenderness and deformity present. Normal range of motion.      Cervical back: Normal range of motion and neck supple.   Lymphadenopathy:      Head:      Right side of head: No submental, submandibular or tonsillar adenopathy.      Left side of head: No submental, submandibular or tonsillar adenopathy.      Cervical: No cervical adenopathy.      Upper Body:      Right upper body: No supraclavicular adenopathy.      Left upper body: No supraclavicular adenopathy.   Skin:     General: Skin is warm and dry.      Capillary Refill: Capillary refill takes  less than 2 seconds.      Findings: No rash.      Nails: There is no clubbing.   Neurological:      Mental Status: He is alert and oriented to person, place, and time.      Cranial Nerves: No cranial nerve deficit.      Sensory: No sensory deficit.      Gait: Gait normal.      Deep Tendon Reflexes: Reflexes normal.   Psychiatric:         Speech: Speech normal.         Behavior: Behavior normal.         Thought Content: Thought content normal.         Judgment: Judgment normal.               Assessment/Plan   Medicare Risks and Personalized Health Plan  CMS Preventative Services Quick Reference  Advance Directive Discussion  Cardiovascular risk  Chronic Pain   Colon Cancer Screening  Dementia/Memory   Depression/Dysphoria  Diabetic Lab Screening   Fall Risk  Immunizations Discussed/Encouraged (specific immunizations; Tdap and Shingrix )  Inadequate Social Support, Isolation, Loneliness, Lack of Transportation, Financial Difficulties, or Caregiver Stress   Inactivity/Sedentary  Lung Cancer Risk  Obesity/Overweight   Osteoporosis Risk  Prostate Cancer Screening     The above risks/problems have been discussed with the patient.  Pertinent information has been shared with the patient in the After Visit Summary.  Follow up plans and orders are seen below in the Assessment/Plan Section.    Diagnoses and all orders for this visit:    1. Medicare annual wellness visit, subsequent (Primary)  -     Comprehensive metabolic panel  -     Lipid panel  -     CBC w AUTO Differential    2. BROWN (dyspnea on exertion)  -     albuterol sulfate  (90 Base) MCG/ACT inhaler; Inhale 2 puffs Every 6 (Six) Hours As Needed for Wheezing or Shortness of Air.  Dispense: 18 g; Refill: 1  -     fluticasone-salmeterol (Advair HFA) 115-21 MCG/ACT inhaler; Inhale 2 puffs 2 (Two) Times a Day.  Dispense: 12 g; Refill: 2  Discussed worsening signs and symptoms along with differential diagnosis.  Advised patient to follow-up with pulmonary   3. Hx of  tobacco use, presenting hazards to health  -     albuterol sulfate  (90 Base) MCG/ACT inhaler; Inhale 2 puffs Every 6 (Six) Hours As Needed for Wheezing or Shortness of Air.  Dispense: 18 g; Refill: 1  -     fluticasone-salmeterol (Advair HFA) 115-21 MCG/ACT inhaler; Inhale 2 puffs 2 (Two) Times a Day.  Dispense: 12 g; Refill: 2    4. Dyslipidemia  -     Lipid panel  Discussed dietary modifications and exercise  5. Vitamin D insufficiency  -     Vitamin D 25 hydroxy    6. Screening for endocrine, nutritional, metabolic and immunity disorder  -     Hemoglobin A1c  -     TSH  -     T4, free  -     Vitamin B12    7. Abnormal glucose  -     Hemoglobin A1c    8. Chronic bilateral low back pain without sciatica  Stable without worsening signs and symptoms advised patient to follow-up with pain management  9. Gastroesophageal reflux disease, unspecified whether esophagitis present  Discussed dietary modifications with patient recommend taking omeprazole nightly  10. Class 1 obesity with serious comorbidity and body mass index (BMI) of 34.0 to 34.9 in adult, unspecified obesity type  Patient's (Body mass index is 34.3 kg/m².) indicates that they are obese (BMI >30) with obesity-related health conditions that include dyslipidemias, GERD and osteoarthritis . Obesity is improving with lifestyle modifications. BMI is is above average; BMI management plan is completed. We discussed low calorie, low carb based diet program, portion control, increasing exercise and management of depression/anxiety/stress to control compensatory eating.       Follow Up:  Return if symptoms worsen or fail to improve.     An After Visit Summary and PPPS were given to the patient.

## 2021-06-22 LAB
25(OH)D3+25(OH)D2 SERPL-MCNC: 34.9 NG/ML (ref 30–100)
ALBUMIN SERPL-MCNC: 4.5 G/DL (ref 3.5–5.2)
ALBUMIN/GLOB SERPL: 2 G/DL
ALP SERPL-CCNC: 86 U/L (ref 39–117)
ALT SERPL-CCNC: 29 U/L (ref 1–41)
AST SERPL-CCNC: 18 U/L (ref 1–40)
BASOPHILS # BLD AUTO: 0.05 10*3/MM3 (ref 0–0.2)
BASOPHILS NFR BLD AUTO: 0.7 % (ref 0–1.5)
BILIRUB SERPL-MCNC: 0.4 MG/DL (ref 0–1.2)
BUN SERPL-MCNC: 11 MG/DL (ref 6–20)
BUN/CREAT SERPL: 11.7 (ref 7–25)
CALCIUM SERPL-MCNC: 9.2 MG/DL (ref 8.6–10.5)
CHLORIDE SERPL-SCNC: 107 MMOL/L (ref 98–107)
CHOLEST SERPL-MCNC: 192 MG/DL (ref 0–200)
CO2 SERPL-SCNC: 24.2 MMOL/L (ref 22–29)
CREAT SERPL-MCNC: 0.94 MG/DL (ref 0.76–1.27)
EOSINOPHIL # BLD AUTO: 0.17 10*3/MM3 (ref 0–0.4)
EOSINOPHIL NFR BLD AUTO: 2.2 % (ref 0.3–6.2)
ERYTHROCYTE [DISTWIDTH] IN BLOOD BY AUTOMATED COUNT: 12.7 % (ref 12.3–15.4)
GLOBULIN SER CALC-MCNC: 2.2 GM/DL
GLUCOSE SERPL-MCNC: 116 MG/DL (ref 65–99)
HBA1C MFR BLD: 6 % (ref 4.8–5.6)
HCT VFR BLD AUTO: 44.4 % (ref 37.5–51)
HDLC SERPL-MCNC: 35 MG/DL (ref 40–60)
HGB BLD-MCNC: 15.1 G/DL (ref 13–17.7)
IMM GRANULOCYTES # BLD AUTO: 0.03 10*3/MM3 (ref 0–0.05)
IMM GRANULOCYTES NFR BLD AUTO: 0.4 % (ref 0–0.5)
LDLC SERPL CALC-MCNC: 125 MG/DL (ref 0–100)
LYMPHOCYTES # BLD AUTO: 2.34 10*3/MM3 (ref 0.7–3.1)
LYMPHOCYTES NFR BLD AUTO: 30.4 % (ref 19.6–45.3)
MCH RBC QN AUTO: 29.2 PG (ref 26.6–33)
MCHC RBC AUTO-ENTMCNC: 34 G/DL (ref 31.5–35.7)
MCV RBC AUTO: 85.7 FL (ref 79–97)
MONOCYTES # BLD AUTO: 0.41 10*3/MM3 (ref 0.1–0.9)
MONOCYTES NFR BLD AUTO: 5.3 % (ref 5–12)
NEUTROPHILS # BLD AUTO: 4.69 10*3/MM3 (ref 1.7–7)
NEUTROPHILS NFR BLD AUTO: 61 % (ref 42.7–76)
NRBC BLD AUTO-RTO: 0 /100 WBC (ref 0–0.2)
PLATELET # BLD AUTO: 229 10*3/MM3 (ref 140–450)
POTASSIUM SERPL-SCNC: 4.3 MMOL/L (ref 3.5–5.2)
PROT SERPL-MCNC: 6.7 G/DL (ref 6–8.5)
RBC # BLD AUTO: 5.18 10*6/MM3 (ref 4.14–5.8)
SODIUM SERPL-SCNC: 140 MMOL/L (ref 136–145)
T4 FREE SERPL-MCNC: 0.98 NG/DL (ref 0.93–1.7)
TRIGL SERPL-MCNC: 177 MG/DL (ref 0–150)
TSH SERPL DL<=0.005 MIU/L-ACNC: 1.53 UIU/ML (ref 0.27–4.2)
VIT B12 SERPL-MCNC: 370 PG/ML (ref 211–946)
VLDLC SERPL CALC-MCNC: 32 MG/DL (ref 5–40)
WBC # BLD AUTO: 7.69 10*3/MM3 (ref 3.4–10.8)

## 2021-09-03 ENCOUNTER — TRANSCRIBE ORDERS (OUTPATIENT)
Dept: GENERAL RADIOLOGY | Facility: HOSPITAL | Age: 51
End: 2021-09-03

## 2021-09-03 ENCOUNTER — HOSPITAL ENCOUNTER (OUTPATIENT)
Dept: GENERAL RADIOLOGY | Facility: HOSPITAL | Age: 51
Discharge: HOME | End: 2021-09-03
Admitting: NURSE PRACTITIONER

## 2021-09-03 DIAGNOSIS — M96.1 POSTLAMINECTOMY SYNDROME, CERVICAL REGION: ICD-10-CM

## 2021-09-03 DIAGNOSIS — M96.1 POSTLAMINECTOMY SYNDROME, CERVICAL REGION: Primary | ICD-10-CM

## 2021-09-03 PROCEDURE — 72110 X-RAY EXAM L-2 SPINE 4/>VWS: CPT

## 2021-10-28 ENCOUNTER — OFFICE VISIT (OUTPATIENT)
Dept: INTERNAL MEDICINE | Facility: CLINIC | Age: 51
End: 2021-10-28

## 2021-10-28 VITALS
WEIGHT: 256 LBS | SYSTOLIC BLOOD PRESSURE: 122 MMHG | RESPIRATION RATE: 15 BRPM | HEART RATE: 74 BPM | TEMPERATURE: 97.8 F | BODY MASS INDEX: 33.93 KG/M2 | OXYGEN SATURATION: 99 % | DIASTOLIC BLOOD PRESSURE: 67 MMHG | HEIGHT: 73 IN

## 2021-10-28 DIAGNOSIS — G89.29 CHRONIC PAIN OF MULTIPLE JOINTS: ICD-10-CM

## 2021-10-28 DIAGNOSIS — R19.7 DIARRHEA, UNSPECIFIED TYPE: ICD-10-CM

## 2021-10-28 DIAGNOSIS — K57.90 DIVERTICULOSIS OF INTESTINE WITHOUT BLEEDING, UNSPECIFIED INTESTINAL TRACT LOCATION: Primary | ICD-10-CM

## 2021-10-28 DIAGNOSIS — M25.50 CHRONIC PAIN OF MULTIPLE JOINTS: ICD-10-CM

## 2021-10-28 PROBLEM — M46.1 INFLAMMATION OF BOTH SACROILIAC JOINTS (HCC): Status: ACTIVE | Noted: 2021-09-03

## 2021-10-28 PROCEDURE — 99214 OFFICE O/P EST MOD 30 MIN: CPT | Performed by: NURSE PRACTITIONER

## 2021-10-28 RX ORDER — NORTRIPTYLINE HYDROCHLORIDE 50 MG/1
CAPSULE ORAL
COMMUNITY
Start: 2021-10-27

## 2021-10-28 RX ORDER — MELOXICAM 7.5 MG/1
7.5 TABLET ORAL DAILY
Qty: 30 TABLET | Refills: 1 | Status: SHIPPED | OUTPATIENT
Start: 2021-10-28

## 2021-10-28 RX ORDER — SULFAMETHOXAZOLE AND TRIMETHOPRIM 800; 160 MG/1; MG/1
1 TABLET ORAL 2 TIMES DAILY
Qty: 14 TABLET | Refills: 0 | Status: SHIPPED | OUTPATIENT
Start: 2021-10-28 | End: 2021-11-04

## 2022-01-07 ENCOUNTER — OFFICE VISIT (OUTPATIENT)
Dept: INTERNAL MEDICINE | Facility: CLINIC | Age: 52
End: 2022-01-07

## 2022-01-07 VITALS
HEART RATE: 83 BPM | OXYGEN SATURATION: 97 % | HEIGHT: 73 IN | DIASTOLIC BLOOD PRESSURE: 78 MMHG | BODY MASS INDEX: 33.81 KG/M2 | WEIGHT: 255.12 LBS | TEMPERATURE: 97.5 F | SYSTOLIC BLOOD PRESSURE: 119 MMHG

## 2022-01-07 DIAGNOSIS — L98.9 SKIN LESION OF SCALP: Primary | ICD-10-CM

## 2022-01-07 PROCEDURE — 99213 OFFICE O/P EST LOW 20 MIN: CPT | Performed by: NURSE PRACTITIONER

## 2022-01-07 NOTE — PROGRESS NOTES
Chief Complaint / Reason:      Chief Complaint   Patient presents with   • Rash     pt has a spot on the top of his head that he noticed 1 month ago       Subjective     HPI  Patient presents today with complaints of spot on top of head on the right side that he noticed about a month ago and denies it hurting but states that it is tender when he pushes down really hard and he has been scratching the area so it probably appears more red than normal. Denies any headache numbness tenderness or bleeding.  History taken from: patient    PMH/FH/Social History were reviewed and updated appropriately in the electronic medical record.   Past Medical History:   Diagnosis Date   • Abdominal pain    • Chest pain     from smoking   • Colon polyps    • Constipation    • Diverticula of intestine    • Dysphagia    • Fatigue    • FHx: colon cancer    • GERD (gastroesophageal reflux disease)    • [place] (hard of hearing)     no aids worn right ear is worse than left   • Hypertension    • Kidney stones     passed without surgery   • Lower back pain    • Nausea    • Palpitations    • Sciatica     right leg   • Wears glasses     reading glasses only     Past Surgical History:   Procedure Laterality Date   • BACK SURGERY     • COLONOSCOPY     • COLONOSCOPY N/A 2/20/2020    Procedure: COLONOSCOPY;  Surgeon: Alyx Malave MD;  Location: [facility] ENDOSCOPY;  Service: Gastroenterology;  Laterality: N/A;   • ENDOSCOPY N/A 2/20/2020    Procedure: ESOPHAGOGASTRODUODENOSCOPY;  Surgeon: Alyx Malave MD;  Location: [facility] ENDOSCOPY;  Service: Gastroenterology;  Laterality: N/A;   • EXTERNAL EAR SURGERY     • LASIK     • SPINAL CORD STIMULATOR IMPLANT     • TONSILLECTOMY     • WISDOM TOOTH EXTRACTION       Social History     Socioeconomic History   • Marital status: Single   Tobacco Use   • Smoking status: Former Smoker     Packs/day: 0.00     Types: Cigarettes   • Smokeless tobacco: Former User   Substance and Sexual Activity   •  Alcohol use: Not Currently     Comment: use to use when younger   • Drug use: Not Currently     Comment: cbd oil with thc now   • Sexual activity: Defer     Family History   Problem Relation Age of Onset   • No Known Problems Mother    • No Known Problems Father        Review of Systems:   Review of Systems   Constitutional: Positive for fatigue.   HENT: Negative.    Respiratory: Negative.    Cardiovascular: Negative.    Skin: Positive for dry skin and skin lesions.   Allergic/Immunologic: Positive for environmental allergies.   Neurological: Negative.    Psychiatric/Behavioral: Negative.          All other systems were reviewed and are negative.  Exceptions are noted in the subjective or above.      Objective     Vital Signs  Vitals:    01/07/22 1131   BP: 119/78   Pulse: 83   Temp: 97.5 °F (36.4 °C)   SpO2: 97%       Body mass index is 33.66 kg/m².    Physical Exam  Vitals and nursing note reviewed.   Constitutional:       Appearance: He is well-developed.   Cardiovascular:      Rate and Rhythm: Normal rate and regular rhythm.      Pulses: Normal pulses.      Heart sounds: Normal heart sounds.   Pulmonary:      Effort: Pulmonary effort is normal.      Breath sounds: Normal breath sounds.   Chest:      Chest wall: No tenderness.   Skin:     General: Skin is warm and dry.      Capillary Refill: Capillary refill takes less than 2 seconds.      Findings: Lesion (A small circular os lesion on head on right side. Area is raised without drainage but appears dry.) present.   Neurological:      Mental Status: He is alert and oriented to person, place, and time.   Psychiatric:         Behavior: Behavior normal.         Thought Content: Thought content normal.         Judgment: Judgment normal.              Results Review:    I reviewed the patient's previous clinical results.       Medication Review:     Current Outpatient Medications:   •  acetaminophen (TYLENOL) 325 MG tablet, Take 650 mg by mouth Every 6 (Six) Hours As  Needed for Mild Pain ., Disp: , Rfl:   •  albuterol sulfate  (90 Base) MCG/ACT inhaler, Inhale 2 puffs Every 6 (Six) Hours As Needed for Wheezing or Shortness of Air., Disp: 18 g, Rfl: 1  •  atorvastatin (LIPITOR) 20 MG tablet, atorvastatin 20 mg tablet, Disp: , Rfl:   •  fluticasone-salmeterol (Advair HFA) 115-21 MCG/ACT inhaler, Inhale 2 puffs 2 (Two) Times a Day., Disp: 12 g, Rfl: 2  •  gabapentin (NEURONTIN) 800 MG tablet, 800 mg 3 (Three) Times a Day., Disp: , Rfl:   •  meloxicam (Mobic) 7.5 MG tablet, Take 1 tablet by mouth Daily., Disp: 30 tablet, Rfl: 1  •  Nerve Stimulator (TENS THERAPY PAIN RELIEF) device, TENS unit and electrodes, Disp: , Rfl:   •  nortriptyline (PAMELOR) 50 MG capsule, , Disp: , Rfl:   •  tiZANidine (ZANAFLEX) 4 MG tablet, 2 mg As Needed., Disp: , Rfl:   •  vitamin C (ASCORBIC ACID) 500 MG tablet, Take 1,000 mg by mouth 2 (two) times a day., Disp: , Rfl:     Diagnoses and all orders for this visit:    Skin lesion of scalp  -     Ambulatory Referral to Dermatology    Discussed differential diagnosis with patient and discussed home care instructions recommend applying coconut oil and avoid scratching area      Return if symptoms worsen or fail to improve.    Ana María Laguna, APRN  01/07/2022

## 2022-03-07 ENCOUNTER — TRANSCRIBE ORDERS (OUTPATIENT)
Dept: LAB | Facility: HOSPITAL | Age: 52
End: 2022-03-07

## 2022-03-07 ENCOUNTER — LAB (OUTPATIENT)
Dept: LAB | Facility: HOSPITAL | Age: 52
End: 2022-03-07

## 2022-03-07 DIAGNOSIS — M96.1 POSTLAMINECTOMY SYNDROME, NOT ELSEWHERE CLASSIFIED: Primary | ICD-10-CM

## 2022-03-07 DIAGNOSIS — M96.1 POSTLAMINECTOMY SYNDROME, NOT ELSEWHERE CLASSIFIED: ICD-10-CM

## 2022-03-07 LAB
HBA1C MFR BLD: 6.1 % (ref 4.8–5.6)
MRSA DNA SPEC QL NAA+PROBE: NORMAL

## 2022-03-07 PROCEDURE — 36415 COLL VENOUS BLD VENIPUNCTURE: CPT

## 2022-03-07 PROCEDURE — 83036 HEMOGLOBIN GLYCOSYLATED A1C: CPT

## 2022-03-07 PROCEDURE — 87641 MR-STAPH DNA AMP PROBE: CPT

## 2022-03-14 ENCOUNTER — TELEPHONE (OUTPATIENT)
Dept: INTERNAL MEDICINE | Facility: CLINIC | Age: 52
End: 2022-03-14

## 2022-03-14 NOTE — TELEPHONE ENCOUNTER
Patient did not complete CT CHEST ordered on 03/01/221. This order is too old to be used. May I cancel the order?

## 2022-04-25 ENCOUNTER — TELEPHONE (OUTPATIENT)
Dept: INTERNAL MEDICINE | Facility: CLINIC | Age: 52
End: 2022-04-25

## 2022-04-25 NOTE — TELEPHONE ENCOUNTER
Provider: MELCHOR     Caller: ENRIKE PEARCE     Phone Number: [phone]    Reason for Call: PATIENT STATED THAT HE HAS A SURGERY SCHEDULED FOR 4/28/22 AND NEEDS TO GET COVID TESTED.  PATIENT IS ASKING TO GET SCHEDULED TOMORROW.

## 2022-05-09 PROCEDURE — U0004 COV-19 TEST NON-CDC HGH THRU: HCPCS | Performed by: NURSE PRACTITIONER

## 2022-08-30 ENCOUNTER — OFFICE VISIT (OUTPATIENT)
Dept: INTERNAL MEDICINE | Facility: CLINIC | Age: 52
End: 2022-08-30

## 2022-08-30 VITALS
WEIGHT: 275 LBS | OXYGEN SATURATION: 97 % | DIASTOLIC BLOOD PRESSURE: 78 MMHG | BODY MASS INDEX: 36.45 KG/M2 | TEMPERATURE: 97.1 F | HEIGHT: 73 IN | HEART RATE: 96 BPM | SYSTOLIC BLOOD PRESSURE: 136 MMHG

## 2022-08-30 DIAGNOSIS — Z13.228 SCREENING FOR ENDOCRINE, NUTRITIONAL, METABOLIC AND IMMUNITY DISORDER: ICD-10-CM

## 2022-08-30 DIAGNOSIS — Z13.29 SCREENING FOR ENDOCRINE, NUTRITIONAL, METABOLIC AND IMMUNITY DISORDER: ICD-10-CM

## 2022-08-30 DIAGNOSIS — K76.0 FATTY LIVER: ICD-10-CM

## 2022-08-30 DIAGNOSIS — R73.03 PREDIABETES: ICD-10-CM

## 2022-08-30 DIAGNOSIS — E78.5 DYSLIPIDEMIA: ICD-10-CM

## 2022-08-30 DIAGNOSIS — Z13.0 SCREENING FOR ENDOCRINE, NUTRITIONAL, METABOLIC AND IMMUNITY DISORDER: ICD-10-CM

## 2022-08-30 DIAGNOSIS — Z13.21 SCREENING FOR ENDOCRINE, NUTRITIONAL, METABOLIC AND IMMUNITY DISORDER: ICD-10-CM

## 2022-08-30 DIAGNOSIS — K57.90 DIVERTICULOSIS OF INTESTINE WITHOUT BLEEDING, UNSPECIFIED INTESTINAL TRACT LOCATION: Primary | ICD-10-CM

## 2022-08-30 PROCEDURE — 99214 OFFICE O/P EST MOD 30 MIN: CPT | Performed by: NURSE PRACTITIONER

## 2022-08-30 RX ORDER — SULFAMETHOXAZOLE AND TRIMETHOPRIM 800; 160 MG/1; MG/1
1 TABLET ORAL 2 TIMES DAILY
Qty: 14 TABLET | Refills: 0 | Status: SHIPPED | OUTPATIENT
Start: 2022-08-30 | End: 2022-09-06

## 2022-08-31 LAB
ALBUMIN SERPL-MCNC: 4.4 G/DL (ref 3.5–5.2)
ALBUMIN/GLOB SERPL: 1.8 G/DL
ALP SERPL-CCNC: 83 U/L (ref 39–117)
ALT SERPL-CCNC: 25 U/L (ref 1–41)
AST SERPL-CCNC: 16 U/L (ref 1–40)
BILIRUB SERPL-MCNC: 0.4 MG/DL (ref 0–1.2)
BUN SERPL-MCNC: 16 MG/DL (ref 6–20)
BUN/CREAT SERPL: 13 (ref 7–25)
CALCIUM SERPL-MCNC: 9.1 MG/DL (ref 8.6–10.5)
CHLORIDE SERPL-SCNC: 102 MMOL/L (ref 98–107)
CHOLEST SERPL-MCNC: 198 MG/DL (ref 0–200)
CO2 SERPL-SCNC: 26.9 MMOL/L (ref 22–29)
CREAT SERPL-MCNC: 1.23 MG/DL (ref 0.76–1.27)
EGFRCR-CYS SERPLBLD CKD-EPI 2021: 69.8 ML/MIN/1.73
GLOBULIN SER CALC-MCNC: 2.4 GM/DL
GLUCOSE SERPL-MCNC: 113 MG/DL (ref 65–99)
HBA1C MFR BLD: 6.3 % (ref 4.8–5.6)
HDLC SERPL-MCNC: 35 MG/DL (ref 40–60)
LDLC SERPL CALC-MCNC: 113 MG/DL (ref 0–100)
POTASSIUM SERPL-SCNC: 4.5 MMOL/L (ref 3.5–5.2)
PROT SERPL-MCNC: 6.8 G/DL (ref 6–8.5)
SODIUM SERPL-SCNC: 138 MMOL/L (ref 136–145)
TRIGL SERPL-MCNC: 289 MG/DL (ref 0–150)
VLDLC SERPL CALC-MCNC: 50 MG/DL (ref 5–40)

## 2022-09-01 NOTE — PROGRESS NOTES
Hemoglobin A1c is a bit higher than it has been in the past, putting him at increased risk for developing diabetes.  He should eat a low-carb, heart healthy diet.  Keep carb intake 45-60 gm with meals and snacks.  Avoid soda, sweet tea, sweets in particular.    Triglycerides are high, LDL cholesterol is slightly elevated.  Continue atorvastatin.  HDL, good cholesterol, is still a bit low. Should eat foods that are high in omega-3 fatty acids including salmon, mackerel, tuna, halibut.  Use canola or olive oil for cooking instead of butter.  Other foods that can increase good cholesterol are brussels sprouts, oranges, lima beans, walnuts, almonds, hazelnuts, peanuts, pistachio, brown rice, kidney beans, berries, eggplant. Engage in daily physical activity with goal of 30-45 minutes of moderate physical activity.

## 2022-12-10 ENCOUNTER — APPOINTMENT (OUTPATIENT)
Dept: CT IMAGING | Facility: HOSPITAL | Age: 52
End: 2022-12-10

## 2022-12-10 ENCOUNTER — HOSPITAL ENCOUNTER (EMERGENCY)
Facility: HOSPITAL | Age: 52
Discharge: HOME | End: 2022-12-10
Attending: EMERGENCY MEDICINE | Admitting: EMERGENCY MEDICINE

## 2022-12-10 VITALS
TEMPERATURE: 98 F | BODY MASS INDEX: 36.45 KG/M2 | HEART RATE: 83 BPM | OXYGEN SATURATION: 95 % | DIASTOLIC BLOOD PRESSURE: 70 MMHG | SYSTOLIC BLOOD PRESSURE: 133 MMHG | RESPIRATION RATE: 18 BRPM | WEIGHT: 275 LBS | HEIGHT: 73 IN
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
## 2022-12-10 PROCEDURE — 99283 EMERGENCY DEPT VISIT LOW MDM: CPT

## 2022-12-10 PROCEDURE — 72125 CT NECK SPINE W/O DYE: CPT

## 2022-12-10 PROCEDURE — 72128 CT CHEST SPINE W/O DYE: CPT

## 2022-12-10 PROCEDURE — 0 LIDOCAINE 1 % SOLUTION

## 2022-12-10 PROCEDURE — 70450 CT HEAD/BRAIN W/O DYE: CPT

## 2022-12-10 RX ORDER — LIDOCAINE HYDROCHLORIDE 10 MG/ML
10 INJECTION, SOLUTION INFILTRATION; PERINEURAL ONCE
Status: COMPLETED | OUTPATIENT
Start: 2022-12-10 | End: 2022-12-10

## 2022-12-10 RX ORDER — BACITRACIN ZINC 500 [USP'U]/G
1 OINTMENT TOPICAL ONCE
Status: COMPLETED | OUTPATIENT
Start: 2022-12-10 | End: 2022-12-10

## 2022-12-10 RX ORDER — MELOXICAM 7.5 MG/1
15 TABLET ORAL DAILY
Status: DISCONTINUED | OUTPATIENT
Start: 2022-12-11 | End: 2022-12-10

## 2022-12-10 RX ORDER — MELOXICAM 7.5 MG/1
15 TABLET ORAL DAILY
Status: DISCONTINUED | OUTPATIENT
Start: 2022-12-10 | End: 2022-12-11 | Stop reason: HOSPADM

## 2022-12-10 RX ADMIN — LIDOCAINE HYDROCHLORIDE 10 ML: 10 INJECTION, SOLUTION INFILTRATION; PERINEURAL at 23:25

## 2022-12-10 RX ADMIN — BACITRACIN ZINC 1 APPLICATION: 500 OINTMENT TOPICAL at 23:33
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
## 2023-01-16 ENCOUNTER — OFFICE VISIT (OUTPATIENT)
Dept: INTERNAL MEDICINE | Facility: CLINIC | Age: 53
End: 2023-01-16
Payer: COMMERCIAL

## 2023-01-16 VITALS
TEMPERATURE: 98.1 F | WEIGHT: 274 LBS | OXYGEN SATURATION: 96 % | BODY MASS INDEX: 36.31 KG/M2 | HEIGHT: 73 IN | DIASTOLIC BLOOD PRESSURE: 64 MMHG | SYSTOLIC BLOOD PRESSURE: 100 MMHG | HEART RATE: 83 BPM
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
## 2023-01-16 DIAGNOSIS — R25.3 EYE MUSCLE TWITCHES: ICD-10-CM

## 2023-01-16 PROCEDURE — 99213 OFFICE O/P EST LOW 20 MIN: CPT | Performed by: NURSE PRACTITIONER
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
## 2023-11-16 ENCOUNTER — OFFICE VISIT (OUTPATIENT)
Dept: INTERNAL MEDICINE | Facility: CLINIC | Age: 53
End: 2023-11-16
Payer: MEDICARE

## 2023-11-16 VITALS
SYSTOLIC BLOOD PRESSURE: 141 MMHG | TEMPERATURE: 97.6 F | OXYGEN SATURATION: 97 % | BODY MASS INDEX: 36.84 KG/M2 | WEIGHT: 278 LBS | RESPIRATION RATE: 18 BRPM | HEIGHT: 73 IN | HEART RATE: 82 BPM | DIASTOLIC BLOOD PRESSURE: 80 MMHG

## 2023-11-16 DIAGNOSIS — E55.9 VITAMIN D INSUFFICIENCY: ICD-10-CM

## 2023-11-16 DIAGNOSIS — E78.5 DYSLIPIDEMIA: ICD-10-CM

## 2023-11-16 DIAGNOSIS — R53.83 FATIGUE, UNSPECIFIED TYPE: ICD-10-CM

## 2023-11-16 DIAGNOSIS — R73.03 PREDIABETES: ICD-10-CM

## 2023-11-16 DIAGNOSIS — K57.90 DIVERTICULOSIS OF INTESTINE WITHOUT BLEEDING, UNSPECIFIED INTESTINAL TRACT LOCATION: ICD-10-CM

## 2023-11-16 DIAGNOSIS — R31.9 HEMATURIA, UNSPECIFIED TYPE: ICD-10-CM

## 2023-11-16 DIAGNOSIS — M54.16 LUMBAR RADICULOPATHY: ICD-10-CM

## 2023-11-16 DIAGNOSIS — Z12.5 SCREENING PSA (PROSTATE SPECIFIC ANTIGEN): ICD-10-CM

## 2023-11-16 DIAGNOSIS — R10.9 SIDE PAIN: Primary | ICD-10-CM

## 2023-11-16 DIAGNOSIS — K76.0 FATTY LIVER: ICD-10-CM

## 2023-11-16 LAB
BILIRUB BLD-MCNC: NEGATIVE MG/DL
CLARITY, POC: CLEAR
COLOR UR: YELLOW
EXPIRATION DATE: ABNORMAL
GLUCOSE UR STRIP-MCNC: NEGATIVE MG/DL
KETONES UR QL: NEGATIVE
LEUKOCYTE EST, POC: NEGATIVE
Lab: ABNORMAL
NITRITE UR-MCNC: NEGATIVE MG/ML
PH UR: 6 [PH] (ref 5–8)
PROT UR STRIP-MCNC: NEGATIVE MG/DL
RBC # UR STRIP: ABNORMAL /UL
SP GR UR: 1.02 (ref 1–1.03)
UROBILINOGEN UR QL: NORMAL

## 2023-11-16 PROCEDURE — 81003 URINALYSIS AUTO W/O SCOPE: CPT | Performed by: NURSE PRACTITIONER

## 2023-11-16 PROCEDURE — 99214 OFFICE O/P EST MOD 30 MIN: CPT | Performed by: NURSE PRACTITIONER

## 2023-11-16 PROCEDURE — 1159F MED LIST DOCD IN RCRD: CPT | Performed by: NURSE PRACTITIONER

## 2023-11-16 PROCEDURE — 1160F RVW MEDS BY RX/DR IN RCRD: CPT | Performed by: NURSE PRACTITIONER

## 2023-11-16 RX ORDER — AMOXICILLIN AND CLAVULANATE POTASSIUM 875; 125 MG/1; MG/1
1 TABLET, FILM COATED ORAL 2 TIMES DAILY
Qty: 20 TABLET | Refills: 0 | Status: SHIPPED | OUTPATIENT
Start: 2023-11-16

## 2023-11-16 NOTE — PROGRESS NOTES
"  Chief Complaint / Reason:      Chief Complaint   Patient presents with    Abdominal Pain     Left side, patient states the pain comes and goes        Subjective     HPI  The patient is a 53-year-old male who presents today with complaints of abdominal pain on the left side and reports that the pain comes and goes. His blood pressure is elevated today at 141/80. The patient has gained 4 pounds. He is due for an annual wellness examination.     The patient has been experiencing intermittent left-sided abdominal pain. If he sits down too long, it feels like he is \"stretching\". He had a history of diverticulitis in the past. He has not taken any antibiotics, and he believes that he has a flare-up in his diverticulitis. His stools are colored green, and he is wondering if it is due to the beverage that he drank that was colored blue. He discontinued drinking carbonated beverages. He walks a lot. He carries his trashcan every Friday down the street in his neighborhood, and he is contemplating whether he needs to refrain from doing it. He is wondering if it could also be his back issues or a hernia. He would like to have some diagnostic tests to evaluate his symptoms. He is fasting today. He had a CT scan in the past, with a normal result. He denies having any hematochezia. He can submit a urine sample today. He usually wakes up once during the night to urinate. He has prediabetes and fatty liver disease. He has been eating a regular diet and exercising. He sometimes experiences nausea and vomiting. He still has his gallbladder. He has been drinking water, Powerade, and milk. He inquires if he needs to take any antibiotics. He has abdominal bloating. Sometimes, his urine is dark in color.     The patient mentions that this provider's schedule is always full, and he has difficulty setting up an appointment. He is due for his physical examination. He was seeing another provider, but he has not seen her for more than 1 year " now.     The patient has a white-looking nevi on his back, which he had scratched. The area was sore. He does not follow a dermatologist.     The patient has a history of scoliosis. He smokes marijuana. He does not take any pain medications.    The patient has been single for about 7 years. However, recently, he met a woman, and they have been dating for approximately 7 weeks now.    History taken from: patient    PMH/FH/Social History were reviewed and updated appropriately in the electronic medical record.   Past Medical History:   Diagnosis Date    Abdominal pain     Chest pain     from smoking    Colon polyps     Constipation     Diverticula of intestine     Dysphagia     Fatigue     FHx: colon cancer     GERD (gastroesophageal reflux disease)     [place] (hard of hearing)     no aids worn right ear is worse than left    Hypertension     Kidney stones     passed without surgery    Lower back pain     Nausea     Palpitations     Sciatica     right leg    Wears glasses     reading glasses only     Past Surgical History:   Procedure Laterality Date    BACK SURGERY      COLONOSCOPY      COLONOSCOPY N/A 2020    Procedure: COLONOSCOPY;  Surgeon: Alyx Malave MD;  Location: [facility] ENDOSCOPY;  Service: Gastroenterology;  Laterality: N/A;    ENDOSCOPY N/A 2020    Procedure: ESOPHAGOGASTRODUODENOSCOPY;  Surgeon: Alyx Malave MD;  Location: [facility] ENDOSCOPY;  Service: Gastroenterology;  Laterality: N/A;    EXTERNAL EAR SURGERY      LASIK      SPINAL CORD STIMULATOR IMPLANT      TONSILLECTOMY      WISDOM TOOTH EXTRACTION       Social History     Socioeconomic History    Marital status: Single   Tobacco Use    Smoking status: Former     Packs/day: 1.00     Years: 20.00     Additional pack years: 0.00     Total pack years: 20.00     Types: Cigarettes     Quit date:      Years since quittin.8    Smokeless tobacco: Former   Substance and Sexual Activity    Alcohol use: Not Currently      Comment: use to use when younger    Drug use: Not Currently     Comment: cbd oil with thc now    Sexual activity: Defer     Family History   Problem Relation Age of Onset    No Known Problems Mother     No Known Problems Father        Review of Systems:   Review of Systems      All other systems were reviewed and are negative.  Exceptions are noted in the subjective or above.      Objective     Vital Signs  Vitals:    11/16/23 0807   BP: 141/80   Pulse: 82   Resp: 18   Temp: 97.6 °F (36.4 °C)   SpO2: 97%       Body mass index is 36.68 kg/m².        Physical Exam  Vitals and nursing note reviewed.   Constitutional:       Appearance: He is well-developed.   Cardiovascular:      Rate and Rhythm: Normal rate and regular rhythm.      Pulses: Normal pulses.      Heart sounds: Normal heart sounds.   Pulmonary:      Effort: Pulmonary effort is normal.      Breath sounds: Normal breath sounds.   Chest:      Chest wall: No tenderness.   Abdominal:      General: Bowel sounds are normal.      Palpations: Abdomen is soft.      Tenderness: There is abdominal tenderness.   Musculoskeletal:         General: Tenderness and deformity present. Normal range of motion.   Skin:     General: Skin is warm and dry.      Capillary Refill: Capillary refill takes less than 2 seconds.      Comments: Suspected seborrheic keratosis.   Neurological:      Mental Status: He is alert and oriented to person, place, and time.   Psychiatric:         Behavior: Behavior normal.         Thought Content: Thought content normal.         Judgment: Judgment normal.              Results Review:    I reviewed the patient's new clinical results.   Office Visit on 11/16/2023   Component Date Value Ref Range Status    Color 11/16/2023 Yellow  Yellow, Straw, Dark Yellow, Dora Final    Clarity, UA 11/16/2023 Clear  Clear Final    Specific Gravity  11/16/2023 1.025  1.005 - 1.030 Final    pH, Urine 11/16/2023 6.0  5.0 - 8.0 Final    Leukocytes 11/16/2023 Negative   Negative Final    Nitrite, UA 11/16/2023 Negative  Negative Final    Protein, POC 11/16/2023 Negative  Negative mg/dL Final    Glucose, UA 11/16/2023 Negative  Negative mg/dL Final    Ketones, UA 11/16/2023 Negative  Negative Final    Urobilinogen, UA 11/16/2023 Normal  Normal, 0.2 E.U./dL Final    Bilirubin 11/16/2023 Negative  Negative Final    Blood, UA 11/16/2023 1+ (A)  Negative Final    Lot Number 11/16/2023 [id number]   Final    Expiration Date 11/16/2023 3/25/24   Final    Glucose 11/16/2023 127 (H)  65 - 99 mg/dL Final    BUN 11/16/2023 12  6 - 20 mg/dL Final    Creatinine 11/16/2023 0.83  0.76 - 1.27 mg/dL Final    EGFR Result 11/16/2023 103.4  >60.0 mL/min/1.73 Final    Comment: GFR Normal >60  Chronic Kidney Disease <60  Kidney Failure <15      BUN/Creatinine Ratio 11/16/2023 14.5  7.0 - 25.0 Final    Sodium 11/16/2023 141  136 - 145 mmol/L Final    Potassium 11/16/2023 4.3  3.5 - 5.2 mmol/L Final    Chloride 11/16/2023 105  98 - 107 mmol/L Final    Total CO2 11/16/2023 26.6  22.0 - 29.0 mmol/L Final    Calcium 11/16/2023 9.5  8.6 - 10.5 mg/dL Final    Total Protein 11/16/2023 6.8  6.0 - 8.5 g/dL Final    Albumin 11/16/2023 4.4  3.5 - 5.2 g/dL Final    Globulin 11/16/2023 2.4  gm/dL Final    A/G Ratio 11/16/2023 1.8  g/dL Final    Total Bilirubin 11/16/2023 0.2  0.0 - 1.2 mg/dL Final    Alkaline Phosphatase 11/16/2023 71  39 - 117 U/L Final    AST (SGOT) 11/16/2023 20  1 - 40 U/L Final    ALT (SGPT) 11/16/2023 31  1 - 41 U/L Final    Hemoglobin A1C 11/16/2023 6.50 (H)  4.80 - 5.60 % Final    Comment: Hemoglobin A1C Ranges:  Increased Risk for Diabetes  5.7% to 6.4%  Diabetes                     >= 6.5%  Diabetic Goal                < 7.0%      Total Cholesterol 11/16/2023 177  0 - 200 mg/dL Final    Comment: Cholesterol Reference Ranges  (U.S. Department of Health and Human Services ATP III  Classifications)  Desirable          <200 mg/dL  Borderline High    200-239 mg/dL  High Risk          >240  mg/dL  Triglyceride Reference Ranges  (U.S. Department of Health and Human Services ATP III  Classifications)  Normal           <150 mg/dL  Borderline High  150-199 mg/dL  High             200-499 mg/dL  Very High        >500 mg/dL  HDL Reference Ranges  (U.S. Department of Health and Human Services ATP III  Classifications)  Low     <40 mg/dl (major risk factor for CHD)  High    >60 mg/dl ('negative' risk factor for CHD)  LDL Reference Ranges  (U.S. Department of Health and Human Services ATP III  Classifications)  Optimal          <100 mg/dL  Near Optimal     100-129 mg/dL  Borderline High  130-159 mg/dL  High             160-189 mg/dL  Very High        >189 mg/dL      Triglycerides 11/16/2023 206 (H)  0 - 150 mg/dL Final    HDL Cholesterol 11/16/2023 37 (L)  40 - 60 mg/dL Final    VLDL Cholesterol Gus 11/16/2023 36  5 - 40 mg/dL Final    LDL Chol Calc (NIH) 11/16/2023 104 (H)  0 - 100 mg/dL Final    25 Hydroxy, Vitamin D 11/16/2023 28.7 (L)  30.0 - 100.0 ng/ml Final    Comment: Reference Range for Total Vitamin D 25(OH)  Deficiency <20.0 ng/mL  Insufficiency 21-29 ng/mL  Sufficiency  ng/mL  Toxicity >100 ng/ml      Vitamin B-12 11/16/2023 471  211 - 946 pg/mL Final    Results may be falsely increased if patient taking Biotin.    TSH 11/16/2023 1.400  0.270 - 4.200 uIU/mL Final    PSA 11/16/2023 1.270  0.000 - 4.000 ng/mL Final    Comment: Testing Method: [employer] Electrochemiluminescence  Immunoassay(ECLIA)  Values obtained with different assay methods or kits cannot  be used interchangeably.      Free T4 11/16/2023 0.98  0.93 - 1.70 ng/dL Final    Results may be falsely increased if patient taking Biotin.    Urine Culture 11/16/2023 Final report   Final    Result 1 11/16/2023 No growth   Final    WBC 11/16/2023 7.60  3.40 - 10.80 10*3/mm3 Final    RBC 11/16/2023 4.96  4.14 - 5.80 10*6/mm3 Final    Hemoglobin 11/16/2023 14.3  13.0 - 17.7 g/dL Final    Hematocrit 11/16/2023 42.6  37.5 - 51.0 % Final     MCV 11/16/2023 85.9  79.0 - 97.0 fL Final    MCH 11/16/2023 28.8  26.6 - 33.0 pg Final    MCHC 11/16/2023 33.6  31.5 - 35.7 g/dL Final    RDW 11/16/2023 12.4  12.3 - 15.4 % Final    Platelets 11/16/2023 213  140 - 450 10*3/mm3 Final    Neutrophil Rel % 11/16/2023 59.9  42.7 - 76.0 % Final    Lymphocyte Rel % 11/16/2023 28.3  19.6 - 45.3 % Final    Monocyte Rel % 11/16/2023 6.4  5.0 - 12.0 % Final    Eosinophil Rel % 11/16/2023 4.2  0.3 - 6.2 % Final    Basophil Rel % 11/16/2023 1.1  0.0 - 1.5 % Final    Neutrophils Absolute 11/16/2023 4.55  1.70 - 7.00 10*3/mm3 Final    Lymphocytes Absolute 11/16/2023 2.15  0.70 - 3.10 10*3/mm3 Final    Monocytes Absolute 11/16/2023 0.49  0.10 - 0.90 10*3/mm3 Final    Eosinophils Absolute 11/16/2023 0.32  0.00 - 0.40 10*3/mm3 Final    Basophils Absolute 11/16/2023 0.08  0.00 - 0.20 10*3/mm3 Final    Immature Granulocyte Rel % 11/16/2023 0.1  0.0 - 0.5 % Final    Immature Grans Absolute 11/16/2023 0.01  0.00 - 0.05 10*3/mm3 Final    nRBC 11/16/2023 0.0  0.0 - 0.2 /100 WBC Final         Medication Review:     Current Outpatient Medications:     acetaminophen (TYLENOL) 325 MG tablet, Take 2 tablets by mouth Every 6 (Six) Hours As Needed for Mild Pain., Disp: , Rfl:     albuterol sulfate  (90 Base) MCG/ACT inhaler, Inhale 2 puffs Every 6 (Six) Hours As Needed for Wheezing or Shortness of Air., Disp: 18 g, Rfl: 1    atorvastatin (LIPITOR) 20 MG tablet, atorvastatin 20 mg tablet, Disp: , Rfl:     fluticasone-salmeterol (Advair HFA) 115-21 MCG/ACT inhaler, Inhale 2 puffs 2 (Two) Times a Day., Disp: 12 g, Rfl: 2    gabapentin (NEURONTIN) 800 MG tablet, 1 tablet 3 (Three) Times a Day., Disp: , Rfl:     meloxicam (Mobic) 7.5 MG tablet, Take 1 tablet by mouth Daily., Disp: 30 tablet, Rfl: 1    Nerve Stimulator (TENS THERAPY PAIN RELIEF) device, TENS unit and electrodes, Disp: , Rfl:     nortriptyline (PAMELOR) 50 MG capsule, , Disp: , Rfl:     tiZANidine (ZANAFLEX) 4 MG tablet, 0.5  tablets As Needed., Disp: , Rfl:     vitamin C (ASCORBIC ACID) 500 MG tablet, Take 2 tablets by mouth 2 (two) times a day., Disp: , Rfl:     amoxicillin-clavulanate (AUGMENTIN) 875-125 MG per tablet, Take 1 tablet by mouth 2 (Two) Times a Day., Disp: 20 tablet, Rfl: 0    Diagnoses and all orders for this visit:    Side pain  -     POCT urinalysis dipstick, automated  -     Urine Culture - Urine, Urine, Clean Catch    Prediabetes  -     Hemoglobin A1c    Fatty liver  -     Comprehensive Metabolic Panel    Diverticulosis of intestine without bleeding, unspecified intestinal tract location  -     amoxicillin-clavulanate (AUGMENTIN) 875-125 MG per tablet; Take 1 tablet by mouth 2 (Two) Times a Day.    Dyslipidemia  -     Lipid Panel    Lumbar radiculopathy    Fatigue, unspecified type  -     CBC Auto Differential  -     Comprehensive Metabolic Panel  -     Vitamin B12  -     TSH  -     T4, Free    Screening PSA (prostate specific antigen)  -     PSA Screen    Vitamin D insufficiency  -     Vitamin D,25-Hydroxy    Hematuria, unspecified type  -     Urine Culture - Urine, Urine, Clean Catch    Other orders  -     CBC & Differential      Left-sided abdominal pain  - A urinalysis was ordered.   - He was encouraged to drink adequate amounts of water.   - I prescribed him antibiotics to take if his white blood cell count result is elevated.   - I will inform him of the laboratory results.     Suspected seborrheic keratosis  - I recommended to the patient that he follow up with dermatology.    General health maintenance  - The patient will complete laboratory work today.   - His blood pressure is elevated today, and he was advised to limit his salt intake.       No follow-ups on file.    Transcribed from ambient dictation for IGNACIA Rai by Renetta Keith.  11/16/23   10:31 EST    Patient or patient representative verbalized consent to the visit recording.  I have personally performed the services described in  this document as transcribed by the above individual, and it is both accurate and complete.

## 2023-11-17 LAB
25(OH)D3+25(OH)D2 SERPL-MCNC: 28.7 NG/ML (ref 30–100)
ALBUMIN SERPL-MCNC: 4.4 G/DL (ref 3.5–5.2)
ALBUMIN/GLOB SERPL: 1.8 G/DL
ALP SERPL-CCNC: 71 U/L (ref 39–117)
ALT SERPL-CCNC: 31 U/L (ref 1–41)
AST SERPL-CCNC: 20 U/L (ref 1–40)
BASOPHILS # BLD AUTO: 0.08 10*3/MM3 (ref 0–0.2)
BASOPHILS NFR BLD AUTO: 1.1 % (ref 0–1.5)
BILIRUB SERPL-MCNC: 0.2 MG/DL (ref 0–1.2)
BUN SERPL-MCNC: 12 MG/DL (ref 6–20)
BUN/CREAT SERPL: 14.5 (ref 7–25)
CALCIUM SERPL-MCNC: 9.5 MG/DL (ref 8.6–10.5)
CHLORIDE SERPL-SCNC: 105 MMOL/L (ref 98–107)
CHOLEST SERPL-MCNC: 177 MG/DL (ref 0–200)
CO2 SERPL-SCNC: 26.6 MMOL/L (ref 22–29)
CREAT SERPL-MCNC: 0.83 MG/DL (ref 0.76–1.27)
EGFRCR SERPLBLD CKD-EPI 2021: 103.4 ML/MIN/1.73
EOSINOPHIL # BLD AUTO: 0.32 10*3/MM3 (ref 0–0.4)
EOSINOPHIL NFR BLD AUTO: 4.2 % (ref 0.3–6.2)
ERYTHROCYTE [DISTWIDTH] IN BLOOD BY AUTOMATED COUNT: 12.4 % (ref 12.3–15.4)
GLOBULIN SER CALC-MCNC: 2.4 GM/DL
GLUCOSE SERPL-MCNC: 127 MG/DL (ref 65–99)
HBA1C MFR BLD: 6.5 % (ref 4.8–5.6)
HCT VFR BLD AUTO: 42.6 % (ref 37.5–51)
HDLC SERPL-MCNC: 37 MG/DL (ref 40–60)
HGB BLD-MCNC: 14.3 G/DL (ref 13–17.7)
IMM GRANULOCYTES # BLD AUTO: 0.01 10*3/MM3 (ref 0–0.05)
IMM GRANULOCYTES NFR BLD AUTO: 0.1 % (ref 0–0.5)
LDLC SERPL CALC-MCNC: 104 MG/DL (ref 0–100)
LYMPHOCYTES # BLD AUTO: 2.15 10*3/MM3 (ref 0.7–3.1)
LYMPHOCYTES NFR BLD AUTO: 28.3 % (ref 19.6–45.3)
MCH RBC QN AUTO: 28.8 PG (ref 26.6–33)
MCHC RBC AUTO-ENTMCNC: 33.6 G/DL (ref 31.5–35.7)
MCV RBC AUTO: 85.9 FL (ref 79–97)
MONOCYTES # BLD AUTO: 0.49 10*3/MM3 (ref 0.1–0.9)
MONOCYTES NFR BLD AUTO: 6.4 % (ref 5–12)
NEUTROPHILS # BLD AUTO: 4.55 10*3/MM3 (ref 1.7–7)
NEUTROPHILS NFR BLD AUTO: 59.9 % (ref 42.7–76)
NRBC BLD AUTO-RTO: 0 /100 WBC (ref 0–0.2)
PLATELET # BLD AUTO: 213 10*3/MM3 (ref 140–450)
POTASSIUM SERPL-SCNC: 4.3 MMOL/L (ref 3.5–5.2)
PROT SERPL-MCNC: 6.8 G/DL (ref 6–8.5)
PSA SERPL-MCNC: 1.27 NG/ML (ref 0–4)
RBC # BLD AUTO: 4.96 10*6/MM3 (ref 4.14–5.8)
SODIUM SERPL-SCNC: 141 MMOL/L (ref 136–145)
T4 FREE SERPL-MCNC: 0.98 NG/DL (ref 0.93–1.7)
TRIGL SERPL-MCNC: 206 MG/DL (ref 0–150)
TSH SERPL DL<=0.005 MIU/L-ACNC: 1.4 UIU/ML (ref 0.27–4.2)
VIT B12 SERPL-MCNC: 471 PG/ML (ref 211–946)
VLDLC SERPL CALC-MCNC: 36 MG/DL (ref 5–40)
WBC # BLD AUTO: 7.6 10*3/MM3 (ref 3.4–10.8)

## 2023-11-18 LAB
BACTERIA UR CULT: NO GROWTH
BACTERIA UR CULT: NORMAL

## 2023-11-21 ENCOUNTER — PATIENT MESSAGE (OUTPATIENT)
Dept: INTERNAL MEDICINE | Facility: CLINIC | Age: 53
End: 2023-11-21
Payer: MEDICARE

## 2023-11-21 DIAGNOSIS — E11.9 NEW ONSET TYPE 2 DIABETES MELLITUS: Primary | ICD-10-CM

## 2023-11-21 RX ORDER — BLOOD-GLUCOSE METER
1 KIT MISCELLANEOUS AS NEEDED
Qty: 1 EACH | Refills: 0 | Status: SHIPPED | OUTPATIENT
Start: 2023-11-21

## 2023-11-21 RX ORDER — LANCETS 30 GAUGE
EACH MISCELLANEOUS
Qty: 100 EACH | Refills: 12 | Status: SHIPPED | OUTPATIENT
Start: 2023-11-21

## 2023-11-21 RX ORDER — ERGOCALCIFEROL 1.25 MG/1
50000 CAPSULE ORAL WEEKLY
Qty: 12 CAPSULE | Refills: 0 | Status: SHIPPED | OUTPATIENT
Start: 2023-11-21

## 2023-11-21 RX ORDER — UBIQUINOL 100 MG
CAPSULE ORAL
Qty: 200 EACH | Refills: 12 | Status: SHIPPED | OUTPATIENT
Start: 2023-11-21

## 2023-11-21 NOTE — TELEPHONE ENCOUNTER
From: Tyree PAYNE  To: Fahad Thurstonaacs  Sent: 11/21/2023 4:23 PM EST  Subject: Labs    Enrique Vásquez your glucose is slightly elevated and it shows you are diabetic. We can start you on medication or you can have 3 months to improve and then start medication if it does not improve with diet and exercise. Your triglycerides and LDL are elevated in addition to your A1c. Your vitamin D is insufficient and we can send in 12 weeks of 50,000/week and then you need to take a daily supplement of 2000 vitamin D3 and in the wintertime recommend taking 4000. Your urine culture did not show any abnormality otherwise labs unremarkable.  Let us know what you would like to do about medication or diet control for elevated A1C as well as vitamin D. Thanks

## 2023-12-08 NOTE — ADDENDUM NOTE
Addended by: JULIANE JEONG on: 8/28/2020 02:00 PM     Modules accepted: Orders     Family is reaching out requesting an appointment - please call to triage symptoms and see if they need to come in soon

## (undated) DEVICE — LUBE JELLY PK/2.75GM STRL BX/144

## (undated) DEVICE — CONMED SCOPE SAVER BITE BLOCK, 20X27 MM: Brand: SCOPE SAVER

## (undated) DEVICE — FRCP BIOP COLD ENDOJAW ALLGTR W/NDL 2.8X2300MM BLU

## (undated) DEVICE — PAD GRND REM POLYHESIVE A/ DISP

## (undated) DEVICE — ENDOSCOPY PORT CONNECTOR FOR OLYMPUS® SCOPES: Brand: ERBE

## (undated) DEVICE — SUCTION CANISTER, 1500CC, RIGID: Brand: DEROYAL

## (undated) DEVICE — Device: Brand: DEFENDO AIR/WATER/SUCTION AND BIOPSY VALVE

## (undated) DEVICE — HYBRID TUBING/CAP SET FOR OLYMPUS® SCOPES: Brand: ERBE

## (undated) DEVICE — Device